# Patient Record
Sex: FEMALE | Race: WHITE
[De-identification: names, ages, dates, MRNs, and addresses within clinical notes are randomized per-mention and may not be internally consistent; named-entity substitution may affect disease eponyms.]

---

## 2017-01-07 ENCOUNTER — HOSPITAL ENCOUNTER (EMERGENCY)
Dept: HOSPITAL 17 - PHED | Age: 82
Discharge: HOME | End: 2017-01-07
Payer: MEDICARE

## 2017-01-07 VITALS
SYSTOLIC BLOOD PRESSURE: 157 MMHG | HEART RATE: 75 BPM | OXYGEN SATURATION: 97 % | DIASTOLIC BLOOD PRESSURE: 72 MMHG | RESPIRATION RATE: 15 BRPM | TEMPERATURE: 98.1 F

## 2017-01-07 VITALS — BODY MASS INDEX: 28.23 KG/M2 | HEIGHT: 64 IN | WEIGHT: 165.35 LBS

## 2017-01-07 VITALS — OXYGEN SATURATION: 97 %

## 2017-01-07 VITALS — SYSTOLIC BLOOD PRESSURE: 189 MMHG | DIASTOLIC BLOOD PRESSURE: 89 MMHG

## 2017-01-07 DIAGNOSIS — Z87.19: ICD-10-CM

## 2017-01-07 DIAGNOSIS — Z87.39: ICD-10-CM

## 2017-01-07 DIAGNOSIS — Z86.79: ICD-10-CM

## 2017-01-07 DIAGNOSIS — I10: ICD-10-CM

## 2017-01-07 DIAGNOSIS — R94.31: ICD-10-CM

## 2017-01-07 DIAGNOSIS — Z86.69: ICD-10-CM

## 2017-01-07 DIAGNOSIS — E78.00: ICD-10-CM

## 2017-01-07 DIAGNOSIS — R42: Primary | ICD-10-CM

## 2017-01-07 LAB
ALP SERPL-CCNC: 58 U/L (ref 45–117)
ALT SERPL-CCNC: 18 U/L (ref 10–53)
ANION GAP SERPL CALC-SCNC: 9 MEQ/L (ref 5–15)
APTT BLD: 31.3 SEC (ref 24.3–30.1)
AST SERPL-CCNC: 14 U/L (ref 15–37)
BASOPHILS # BLD AUTO: 0 TH/MM3 (ref 0–0.2)
BASOPHILS NFR BLD: 0.6 % (ref 0–2)
BILIRUB SERPL-MCNC: 0.4 MG/DL (ref 0.2–1)
BUN SERPL-MCNC: 18 MG/DL (ref 7–18)
CHLORIDE SERPL-SCNC: 101 MEQ/L (ref 98–107)
COLOR UR: YELLOW
COMMENT (UR): (no result)
CULTURE IF INDICATED: (no result)
EOSINOPHIL # BLD: 0.1 TH/MM3 (ref 0–0.4)
EOSINOPHIL NFR BLD: 1.6 % (ref 0–4)
ERYTHROCYTE [DISTWIDTH] IN BLOOD BY AUTOMATED COUNT: 13.7 % (ref 11.6–17.2)
GFR SERPLBLD BASED ON 1.73 SQ M-ARVRAT: 68 ML/MIN (ref 89–?)
GLUCOSE UR STRIP-MCNC: (no result) MG/DL
HCO3 BLD-SCNC: 29 MEQ/L (ref 21–32)
HCT VFR BLD CALC: 42 % (ref 35–46)
HEMO FLAGS: (no result)
HGB UR QL STRIP: (no result)
INR PPP: 1 RATIO
KETONES UR STRIP-MCNC: (no result) MG/DL
LEUKOCYTE ESTERASE UR QL STRIP: (no result) /HPF (ref 0–5)
LYMPHOCYTES # BLD AUTO: 1.5 TH/MM3 (ref 1–4.8)
LYMPHOCYTES NFR BLD AUTO: 19.5 % (ref 9–44)
MCH RBC QN AUTO: 28.5 PG (ref 27–34)
MCHC RBC AUTO-ENTMCNC: 33 % (ref 32–36)
MCV RBC AUTO: 86.5 FL (ref 80–100)
METHOD OF COLLECTION: (no result)
MONOCYTES NFR BLD: 6 % (ref 0–8)
NEUTROPHILS # BLD AUTO: 5.4 TH/MM3 (ref 1.8–7.7)
NEUTROPHILS NFR BLD AUTO: 72.3 % (ref 16–70)
NITRITE UR QL STRIP: (no result)
PLATELET # BLD: 278 TH/MM3 (ref 150–450)
POTASSIUM SERPL-SCNC: 3.8 MEQ/L (ref 3.5–5.1)
PROTHROMBIN TIME: 10.6 SEC (ref 9.8–11.6)
RBC # BLD AUTO: 4.85 MIL/MM3 (ref 4–5.3)
RBC #/AREA URNS HPF: (no result) /HPF (ref 0–3)
SODIUM SERPL-SCNC: 139 MEQ/L (ref 136–145)
SP GR UR STRIP: 1.02 (ref 1–1.03)
SQUAMOUS #/AREA URNS HPF: (no result) /HPF (ref 0–5)
WBC # BLD AUTO: 7.5 TH/MM3 (ref 4–11)

## 2017-01-07 PROCEDURE — 93005 ELECTROCARDIOGRAM TRACING: CPT

## 2017-01-07 PROCEDURE — 81001 URINALYSIS AUTO W/SCOPE: CPT

## 2017-01-07 PROCEDURE — 96361 HYDRATE IV INFUSION ADD-ON: CPT

## 2017-01-07 PROCEDURE — 85730 THROMBOPLASTIN TIME PARTIAL: CPT

## 2017-01-07 PROCEDURE — 85025 COMPLETE CBC W/AUTO DIFF WBC: CPT

## 2017-01-07 PROCEDURE — 84484 ASSAY OF TROPONIN QUANT: CPT

## 2017-01-07 PROCEDURE — 80053 COMPREHEN METABOLIC PANEL: CPT

## 2017-01-07 PROCEDURE — 70450 CT HEAD/BRAIN W/O DYE: CPT

## 2017-01-07 PROCEDURE — 96374 THER/PROPH/DIAG INJ IV PUSH: CPT

## 2017-01-07 PROCEDURE — 99284 EMERGENCY DEPT VISIT MOD MDM: CPT

## 2017-01-07 PROCEDURE — 85610 PROTHROMBIN TIME: CPT

## 2017-01-07 RX ADMIN — SODIUM CHLORIDE, PRESERVATIVE FREE PRN ML: 5 INJECTION INTRAVENOUS at 14:43

## 2017-01-07 RX ADMIN — SODIUM CHLORIDE, PRESERVATIVE FREE PRN ML: 5 INJECTION INTRAVENOUS at 13:03

## 2017-01-07 NOTE — RADHPO
EXAM DATE/TIME:  01/07/2017 13:09 

 

[facility] COMPARISON:     No previous studies available for comparison.

 

INDICATIONS :     Dizziness today.

                      

RADIATION DOSE:     65.90 CTDIvol (mGy) 

 

 

MEDICAL HISTORY :     Hypertension.  

SURGICAL HISTORY :      Tonsillectomy. 

ENCOUNTER:      Initial

ACUITY:      1 day

PAIN SCALE:      0/10

LOCATION:       Bilateral  head

 

TECHNIQUE:     Multiple contiguous axial images were obtained of the head.  Using automated exposure 
control and adjustment of the mA and/or kV according to patient size, radiation dose was kept as low 
as reasonably achievable to obtain optimal diagnostic quality images. 

 

FINDINGS:     

CEREBRUM:     The ventricles are normal for age.  No evidence of midline shift, mass lesion, hemorrha
ge or acute infarction.  No extra-axial fluid collections are seen.

POSTERIOR FOSSA:     The cerebellum and brainstem are intact.  The 4th ventricle is midline.  The cer
ebellopontine angle is unremarkable.

EXTRACRANIAL:     The visualized portion of the orbits is intact.

SKULL:     The calvaria is intact.  No evidence of skull fracture.

 

CONCLUSION:     Negative for acute process.  

 

Jon Perez MD FACR on January 07, 2017 at 13:25           

Board Certified Radiologist.

 This report was verified electronically.

## 2017-01-07 NOTE — EKG
Date Performed: 01/07/2017       Time Performed: 12:52:22

 

PTAGE:      85 years

 

EKG:      Sinus rhythm 

 

 Possible left anterior fascicular block QRS changes V3/V4 may be due to LVH but cannot rule out ante
rior infarct Abnormal ECG

 

PREVIOUS TRACING       : 04/14/2014 18.42 Since previous tracing, no significant change noted

 

DOCTOR:   Orion Falcon  Interpretating Date/Time  01/07/2017 15:13:11

## 2017-01-07 NOTE — PD
HPI


Chief Complaint:  Dizziness


Time Seen by Provider:  12:34


Travel History


International Travel<30 days:  No


Contact w/Intl Traveler<30days:  No


Traveled to known affect area:  No





History of Present Illness


HPI


Patient is an 85-year-old female who comes in complaining of dizziness.  She 

says she has been having a fullness in her right ear for the past few days, and 

has felt very dizzy today when she woke up.  She says the dizziness mostly 

comes on when she sits or stands up.  He feels better when she lays still.  She 

denies any falls or head trauma.  She denies any chest pain or shortness of 

breath.  She denies any fever or chills.





PFSH


Past Medical History


Arthritis:  Yes


Blood Disorders:  No


Heart Rhythm Problems:  No


Cancer:  No


Cardiovascular Problems:  Yes


High Cholesterol:  Yes


Chest Pain:  Yes


Congestive Heart Failure:  No


Diminished Hearing:  Yes (STS SHE WEARS BILAT HEARING)


Endocrine:  No


Gastrointestinal Disorders:  Yes (GI BLEED)


Genitourinary:  No


Hypertension:  Yes


Immune Disorder:  No


Implanted Vascular Access Dvce:  Yes


Musculoskeletal:  Yes


Neurologic:  Yes


Psychiatric:  No


Reproductive:  No


Respiratory:  No


Menopausal:  Yes





Past Surgical History


Abdominal Surgery:  Yes (APPENDECTOMY (CHILDHOOD))


AICD:  No


Appendectomy:  Yes (childhood)


Arteriovenous Shunt:  No


Cardiac Surgery:  Yes


Ear Surgery:  No


Endocrine Surgery:  No


Eye Surgery:  No


Genitourinary Surgery:  No


Gynecologic Surgery:  No


Insulin Pump:  No


Joint Replacement:  Yes (bilateral knee)


Oral Surgery:  Yes (T&A (CHILDHOOD))


Pacemaker:  No


Thoracic Surgery:  No


Tonsillectomy:  Yes


Other Surgery:  Yes (BREAST BX X 2)





Social History


Alcohol Use:  No


Tobacco Use:  No


Substance Use:  No





Allergies-Medications


(Allergen,Severity, Reaction):  


Coded Allergies:  


     Diclofenac (Verified  Allergy, Severe, SEVERE VOMITING, 1/7/17)


     Paxil (Verified  Allergy, Unknown, 1/7/17)


     Septra (Verified  Allergy, Unknown, 1/7/17)


     Zoloft (Verified  Allergy, Unknown, 1/7/17)


Uncoded Allergies:  


     PAIN MEDICATIONS (Allergy, Unknown, 1/7/17)


Reported Meds & Prescriptions





Reported Meds & Active Scripts


Active


Reported


[Acid Reflux]     


[Cholesterol Med]     


[B/P Med]     








Review of Systems


Except as stated in HPI:  all other systems reviewed are Neg


General / Constitutional:  No: Fever, Chills


Eyes:  No: Blurred Vision


HENT:  Positive: Vertigo,  No: Headaches


Cardiovascular:  No: Chest Pain or Discomfort


Respiratory:  No: Shortness of Breath


Gastrointestinal:  No: Nausea, Vomiting


Musculoskeletal:  No: Weakness, Edema


Skin:  No Change in Pigmentation


Neurologic:  Positive: Dizziness





Physical Exam


Narrative


GENERAL: Awake and alert in no acute distress.


SKIN: Warm and dry.


HEAD: Atraumatic. Normocephalic. 


EYES: Pupils equal and round. No scleral icterus.  Extraocular movements intact.


ENT: Mucous membranes pink and moist.  Tympanic membranes with no erythema 

bilaterally, light reflex present bilaterally.


NECK: Trachea midline. No JVD. 


CARDIOVASCULAR: Regular rate and rhythm.  No murmur appreciated.


RESPIRATORY: No accessory muscle use. Clear to auscultation. Breath sounds 

equal bilaterally. 


GASTROINTESTINAL: Abdomen soft, non-tender, nondistended. 


MUSCULOSKELETAL: No obvious deformities. No clubbing.  No cyanosis.  No edema. 


NEUROLOGICAL: Awake and alert. No obvious cranial nerve deficits.  Motor 

grossly within normal limits. Normal speech.  Normal cerebellar function 

testing.


PSYCHIATRIC: Appropriate mood and affect; insight and judgment normal.





Data


Data


Last Documented VS





Vital Signs








  Date Time  Temp Pulse Resp B/P Pulse Ox O2 Delivery O2 Flow Rate FiO2


 


1/7/17 12:50     97 Room Air  


 


1/7/17 12:31 98.1 75 15 157/72    








Orders





 Electrocardiogram (1/7/17 12:39)


Complete Blood Count With Diff (1/7/17 12:39)


Comprehensive Metabolic Panel (1/7/17 12:39)


Troponin I (1/7/17 12:39)


Act Partial Throm Time (Ptt) (1/7/17 12:39)


Prothrombin Time / Inr (Pt) (1/7/17 12:39)


Urinalysis - C+S If Indicated (1/7/17 12:39)


Ua Includes Microscopic (1/7/17 12:39)


Ct Brain W/O Iv Contrast(Rout) (1/7/17 12:39)


Ecg Monitoring (1/7/17 12:39)


Iv Access Insert/Monitor (1/7/17 12:39)


Oximetry (1/7/17 12:39)


Meclizine (Antivert) (1/7/17 12:45)


Ondansetron Inj (Zofran Inj) (1/7/17 12:45)


Sodium Chloride 0.9% Flush (Ns Flush) (1/7/17 12:45)


Sodium Chlorid 0.9% 500 Ml Inj (Ns 500 M (1/7/17 12:45)


Metoclopramide (Reglan) (1/7/17 14:00)





Labs





 Laboratory Tests








Test 1/7/17 1/7/17





 12:51 13:40


 


White Blood Count 7.5 TH/MM3 


 


Red Blood Count 4.85 MIL/MM3 


 


Hemoglobin 13.8 GM/DL 


 


Hematocrit 42.0 % 


 


Mean Corpuscular Volume 86.5 FL 


 


Mean Corpuscular Hemoglobin 28.5 PG 


 


Mean Corpuscular Hemoglobin 33.0 % 





Concent  


 


Red Cell Distribution Width 13.7 % 


 


Platelet Count 278 TH/MM3 


 


Mean Platelet Volume 7.0 FL 


 


Neutrophils (%) (Auto) 72.3 % 


 


Lymphocytes (%) (Auto) 19.5 % 


 


Monocytes (%) (Auto) 6.0 % 


 


Eosinophils (%) (Auto) 1.6 % 


 


Basophils (%) (Auto) 0.6 % 


 


Neutrophils # (Auto) 5.4 TH/MM3 


 


Lymphocytes # (Auto) 1.5 TH/MM3 


 


Monocytes # (Auto) 0.5 TH/MM3 


 


Eosinophils # (Auto) 0.1 TH/MM3 


 


Basophils # (Auto) 0.0 TH/MM3 


 


CBC Comment DIFF FINAL  


 


Differential Comment   


 


Prothrombin Time 10.6 SEC 


 


Prothromb Time International 1.0 RATIO 





Ratio  


 


Activated Partial 31.3 SEC 





Thromboplast Time  


 


Sodium Level 139 MEQ/L 


 


Potassium Level 3.8 MEQ/L 


 


Chloride Level 101 MEQ/L 


 


Carbon Dioxide Level 29.0 MEQ/L 


 


Anion Gap 9 MEQ/L 


 


Blood Urea Nitrogen 18 MG/DL 


 


Creatinine 0.80 MG/DL 


 


Estimat Glomerular Filtration 68 ML/MIN 





Rate  


 


Random Glucose 108 MG/DL 


 


Calcium Level 9.3 MG/DL 


 


Total Bilirubin 0.4 MG/DL 


 


Aspartate Amino Transf 14 U/L 





(AST/SGOT)  


 


Alanine Aminotransferase 18 U/L 





(ALT/SGPT)  


 


Alkaline Phosphatase 58 U/L 


 


Troponin I LESS THAN 0.02 





 NG/ML 


 


Total Protein 7.1 GM/DL 


 


Albumin 3.3 GM/DL 


 


Urine Collection Type  CLEAN CATCH 


 


Urine Color  YELLOW 


 


Urine Turbidity  CLEAR 


 


Urine pH  6.5 


 


Urine Specific Gravity  1.017 


 


Urine Protein  NEG mg/dL


 


Urine Glucose (UA)  NEG mg/dL


 


Urine Ketones  TRACE mg/dL


 


Urine Occult Blood  NEG 


 


Urine Nitrite  NEG 


 


Urine Bilirubin  NEG 


 


Urine Leukocyte Esterase  NEG 


 


Urine RBC  0-3 /hpf


 


Urine WBC  0-2 /hpf


 


Urine Squamous Epithelial  6-8 /hpf





Cells  


 


Microscopic Urinalysis Comment  CULT NOT





  INDICATED


 


Urine Collection Time  13:40 











[facility]


Medical Decision Making


Medical Screen Exam Complete:  Yes


Emergency Medical Condition:  Yes


Interpretation(s)


ECG shows normal sinus rhythm at 63, no ST elevation or depression.  Normal 

intervals.


Differential Diagnosis


ICH versus vertigo versus electrolyte imbalance versus infection versus 

arrhythmia


Narrative Course


Patient is an 85-year-old female comes in complaining of dizziness that is 

worse with sitting or standing up.  Exam shows no neurologic abnormalities.  IV 

established, patient connected to cardiac monitor.  ECG shows normal sinus 

rhythm, normal intervals.





Labs sent show no acute abnormalities.  CT of the head performed shows no acute 

abnormalities.  Patient given IV fluids, meclizine.  She reports some 

improvement of her symptoms, but still feels a little dizzy.





Given a dose of Reglan.  She reports resolution of her symptoms.





Patient will be discharged with prescription for meclizine.  Advised follow-up 

with her doctor.  Advised she may need to follow-up with ENT if her symptoms 

continue.  Advised to return to the ED as needed for any worsening symptoms.  

Patient is comfortable discharge at this time.





Diagnosis





 Primary Impression:  


 Vertigo


Patient Instructions:  Benign Paroxysmal Positional Vertigo (ED), General 

Instructions





***Additional Instructions:


Follow up with your doctor.  Take the Meclizine as needed for dizziness.  Be 

sure to drink plenty of fluids.  Return to the ED as needed for any worsening 

symptoms.


Scripts


Meclizine 25 Mg Tab25 Mg PO TID PRN (VERTIGO) 7 Days  Ref 0


   Prov:Vanessa Diaz MD         1/7/17


Disposition:  01 DISCHARGE HOME


Condition:  Stable








Vanessa Diaz MD Jan 7, 2017 14:27

## 2017-01-28 ENCOUNTER — HOSPITAL ENCOUNTER (EMERGENCY)
Dept: HOSPITAL 17 - PHED | Age: 82
Discharge: HOME | End: 2017-01-28
Payer: MEDICARE

## 2017-01-28 VITALS — WEIGHT: 165.35 LBS | BODY MASS INDEX: 28.23 KG/M2 | HEIGHT: 64 IN

## 2017-01-28 VITALS
OXYGEN SATURATION: 96 % | HEART RATE: 66 BPM | DIASTOLIC BLOOD PRESSURE: 70 MMHG | RESPIRATION RATE: 16 BRPM | SYSTOLIC BLOOD PRESSURE: 159 MMHG | TEMPERATURE: 95.2 F

## 2017-01-28 VITALS
RESPIRATION RATE: 18 BRPM | DIASTOLIC BLOOD PRESSURE: 61 MMHG | OXYGEN SATURATION: 94 % | SYSTOLIC BLOOD PRESSURE: 146 MMHG | HEART RATE: 56 BPM

## 2017-01-28 VITALS
DIASTOLIC BLOOD PRESSURE: 67 MMHG | SYSTOLIC BLOOD PRESSURE: 172 MMHG | RESPIRATION RATE: 18 BRPM | OXYGEN SATURATION: 96 % | HEART RATE: 58 BPM

## 2017-01-28 VITALS
HEART RATE: 65 BPM | SYSTOLIC BLOOD PRESSURE: 169 MMHG | DIASTOLIC BLOOD PRESSURE: 85 MMHG | OXYGEN SATURATION: 96 % | RESPIRATION RATE: 18 BRPM

## 2017-01-28 VITALS — DIASTOLIC BLOOD PRESSURE: 63 MMHG | RESPIRATION RATE: 18 BRPM | SYSTOLIC BLOOD PRESSURE: 156 MMHG

## 2017-01-28 VITALS — OXYGEN SATURATION: 96 %

## 2017-01-28 DIAGNOSIS — R94.31: ICD-10-CM

## 2017-01-28 DIAGNOSIS — R42: Primary | ICD-10-CM

## 2017-01-28 DIAGNOSIS — R53.1: ICD-10-CM

## 2017-01-28 DIAGNOSIS — E78.00: ICD-10-CM

## 2017-01-28 DIAGNOSIS — I10: ICD-10-CM

## 2017-01-28 LAB
ANION GAP SERPL CALC-SCNC: 7 MEQ/L (ref 5–15)
BASOPHILS # BLD AUTO: 0.3 TH/MM3 (ref 0–0.2)
BASOPHILS NFR BLD: 4.5 % (ref 0–2)
BUN SERPL-MCNC: 16 MG/DL (ref 7–18)
CHLORIDE SERPL-SCNC: 94 MEQ/L (ref 98–107)
CK SERPL-CCNC: 32 U/L (ref 26–192)
EOSINOPHIL # BLD: 0.3 TH/MM3 (ref 0–0.4)
EOSINOPHIL NFR BLD: 4 % (ref 0–4)
ERYTHROCYTE [DISTWIDTH] IN BLOOD BY AUTOMATED COUNT: 13.1 % (ref 11.6–17.2)
GFR SERPLBLD BASED ON 1.73 SQ M-ARVRAT: 60 ML/MIN (ref 89–?)
HCO3 BLD-SCNC: 32 MEQ/L (ref 21–32)
HCT VFR BLD CALC: 40.8 % (ref 35–46)
HEMO FLAGS: (no result)
LYMPHOCYTES # BLD AUTO: 1.5 TH/MM3 (ref 1–4.8)
LYMPHOCYTES NFR BLD AUTO: 21.2 % (ref 9–44)
MAGNESIUM SERPL-MCNC: 2.1 MG/DL (ref 1.5–2.5)
MCH RBC QN AUTO: 29 PG (ref 27–34)
MCHC RBC AUTO-ENTMCNC: 33.5 % (ref 32–36)
MCV RBC AUTO: 86.3 FL (ref 80–100)
MONOCYTES NFR BLD: 7.8 % (ref 0–8)
NEUTROPHILS # BLD AUTO: 4.6 TH/MM3 (ref 1.8–7.7)
NEUTROPHILS NFR BLD AUTO: 62.5 % (ref 16–70)
PLATELET # BLD: 261 TH/MM3 (ref 150–450)
POTASSIUM SERPL-SCNC: 2.9 MEQ/L (ref 3.5–5.1)
RBC # BLD AUTO: 4.73 MIL/MM3 (ref 4–5.3)
SODIUM SERPL-SCNC: 133 MEQ/L (ref 136–145)
WBC # BLD AUTO: 7.3 TH/MM3 (ref 4–11)

## 2017-01-28 PROCEDURE — 93005 ELECTROCARDIOGRAM TRACING: CPT

## 2017-01-28 PROCEDURE — 96361 HYDRATE IV INFUSION ADD-ON: CPT

## 2017-01-28 PROCEDURE — 84484 ASSAY OF TROPONIN QUANT: CPT

## 2017-01-28 PROCEDURE — A9579 GAD-BASE MR CONTRAST NOS,1ML: HCPCS

## 2017-01-28 PROCEDURE — 83735 ASSAY OF MAGNESIUM: CPT

## 2017-01-28 PROCEDURE — 85025 COMPLETE CBC W/AUTO DIFF WBC: CPT

## 2017-01-28 PROCEDURE — 99284 EMERGENCY DEPT VISIT MOD MDM: CPT

## 2017-01-28 PROCEDURE — 82550 ASSAY OF CK (CPK): CPT

## 2017-01-28 PROCEDURE — 96360 HYDRATION IV INFUSION INIT: CPT

## 2017-01-28 PROCEDURE — 70553 MRI BRAIN STEM W/O & W/DYE: CPT

## 2017-01-28 PROCEDURE — 80048 BASIC METABOLIC PNL TOTAL CA: CPT

## 2017-01-28 NOTE — RADHPO
EXAM DATE/TIME:  01/28/2017 10:51 

 

[facility] COMPARISON:     

CT BRAIN W/O CONTRAST, January 07, 2017, 13:09.

       

 

 

INDICATIONS :     

Dizziness.

                     

 

CONTRAST:     

15 cc Omniscan (gadodiamide) IV

                     

 

MEDICAL HISTORY :     

Hypercholesterolemia. Gastroesophageal reflux disease. Hypertension. 

 

SURGICAL HISTORY :     

Appendectomy. Tonsillectomy.   Bilateral knee replacements.

 

ENCOUNTER:     

Initial

 

ACUITY:     

1 day

 

PAIN SCORE:     

0/10

 

LOCATION:       

cranial 

 

TECHNIQUE:     

Multiplanar, multisequence MRI of the brain was performed both prior to and following the administrat
ion of paramagnetic contrast.

 

FINDINGS:     

 

CEREBRUM:     

The ventricles are normal for age. There is bilateral cortical atrophy. No evidence of midline shift,
 mass lesion, hemorrhage or acute infarction.  No extraaxial fluid collections are seen.  The pituita
ry gland and suprasellar cistern are normal in configuration.

 

WHITE MATTER:     

No significant signal abnormalities are seen in the white matter. A few high signal spots are seen in
 the white matter tracts bilaterally characteristic of ischemic edema patient. This correlates with p
atient's age.

 

POSTERIOR FOSSA:     

The cerebellum and brainstem are intact.  The 4th ventricle is midline. The cerebellopontine angle is
 unremarkable.  The cerebellar tonsils are normal in position.

 

DIFFUSION IMAGING:     

No focal areas of restricted diffusion are seen.  No evidence of acute infarction.

 

EXTRACRANIAL:     

The visualized portions of the orbits and paranasal sinuses are unremarkable.

 

POST-CONTRAST:     

No abnormal areas of parenchymal or dural enhancement.  No evidence of blood-brain barrier breakdown.


 

CONCLUSION:     

1. Bilateral cortical atrophy and chronic white matter changes characteristic for patient's age.

2. No acute intracranial pathology.

 

 

 

 Vern Perdue MD on January 28, 2017 at 11:24           

Board Certified Radiologist.

 This report was verified electronically.

## 2017-01-28 NOTE — PD
HPI


Chief Complaint:  Dizziness


Time Seen by Provider:  08:34


Travel History


International Travel<30 days:  No


Contact w/Intl Traveler<30days:  No


Traveled to known affect area:  No





History of Present Illness


HPI


The patient is a 85-year-old  female who presents emergency department 

for dizziness.  The patient states she had dizziness several weeks ago and was 

evaluated in the emergency department.  The patient was diagnosed with vertigo 

at that time and was prescribed meclizine.  The patient states her symptoms did 

improve, however, the meclizine constipated her and also made her significantly 

drowsy.  The patient called her primary physician's office, Dr. Maciel, who 

advised to take her medications.  However, the patient states she is unable to 

tolerate her medications, therefore, stopped taking her medications.  The 

patient states she awakened this morning to use the restroom, when she stood up 

she had vertigo.  Patient states everything was spinning, states she 

subsequently fell, however, did not strike her head or lose consciousness.  The 

patient currently complains of intermittent vertigo which is worse with 

positional movements and sitting upright, also states she has difficulty 

ambulating secondary to weakness and vertigo.  She denies any focal deficits of 

the upper or lower extremities.  The patient denies any history of CVA or TIA.  

She does note a history of hypertension and hyperlipidemia.  The patient's 

symptoms are moderate, worse with certain positional changes, and alleviated 

with meclizine, however, the patient was unable to tolerate the meclizine.





PFSH


Past Medical History


Arthritis:  Yes


Blood Disorders:  No


Heart Rhythm Problems:  No


Cancer:  No


Cardiovascular Problems:  Yes


High Cholesterol:  Yes


Chest Pain:  Yes


Congestive Heart Failure:  No


Diminished Hearing:  Yes (STS SHE WEARS BILAT HEARING)


Endocrine:  No


Gastrointestinal Disorders:  Yes (GI BLEED)


Genitourinary:  No


Hypertension:  Yes


Immune Disorder:  No


Implanted Vascular Access Dvce:  Yes


Musculoskeletal:  Yes


Neurologic:  Yes


Psychiatric:  No


Reproductive:  No


Respiratory:  No


Menopausal:  Yes





Past Surgical History


Abdominal Surgery:  Yes (APPENDECTOMY (CHILDHOOD))


AICD:  No


Appendectomy:  Yes (childhood)


Arteriovenous Shunt:  No


Cardiac Surgery:  Yes


Ear Surgery:  No


Endocrine Surgery:  No


Eye Surgery:  No


Genitourinary Surgery:  No


Gynecologic Surgery:  No


Insulin Pump:  No


Joint Replacement:  Yes (bilateral knee)


Oral Surgery:  Yes (T&A (CHILDHOOD))


Pacemaker:  No


Thoracic Surgery:  No


Tonsillectomy:  Yes


Other Surgery:  Yes (BREAST BX X 2)





Social History


Alcohol Use:  No


Tobacco Use:  No


Substance Use:  No





Allergies-Medications


(Allergen,Severity, Reaction):  


Coded Allergies:  


     Diclofenac (Verified  Allergy, Severe, SEVERE VOMITING, 1/28/17)


     Paxil (Verified  Allergy, Unknown, 1/28/17)


     Septra (Verified  Allergy, Unknown, 1/28/17)


     Zoloft (Verified  Allergy, Unknown, 1/28/17)


Uncoded Allergies:  


     PAIN MEDICATIONS (Allergy, Unknown, 1/7/17)


Reported Meds & Prescriptions





Reported Meds & Active Scripts


Active


Reported


[Cholesterol Med]     


[B/P Med]     








Review of Systems


Except as stated in HPI:  all other systems reviewed are Neg


Eyes:  No: Diploplia, Blurred Vision


HENT:  Positive: Headaches, Vertigo,  No: Lightheadedness


Cardiovascular:  No: Chest Pain or Discomfort


Respiratory:  No: Shortness of Breath


Gastrointestinal:  No: Nausea, Vomiting


Neurologic:  Positive: Dizziness, Ataxia (difficulty walking secondary to 

vertigo), Headache,  No: Change in Mentation, Slurred Speech, Paresthesia, 

Sensory Disturbance





Physical Exam


Narrative


GENERAL: Awake, alert, 85-year-old female who appears her stated age and is in 

no acute respiratory distress.


SKIN: Warm and dry.


HEAD: Atraumatic. Normocephalic. 


EYES: Pupils equal and round.  3 mm bilateral and reactive.  EOMs are intact.  

No obvious nystagmus.


ENT: No nasal bleeding or discharge.  Mucous membranes pink and moist.


NECK: Trachea midline. No JVD. 


CARDIOVASCULAR: Regular rate and rhythm.  No murmur appreciated.


RESPIRATORY: No accessory muscle use. Clear to auscultation. Breath sounds 

equal bilaterally. 


GASTROINTESTINAL: Abdomen soft, non-tender, nondistended.  No rebound 

tenderness.


MUSCULOSKELETAL: No obvious deformities. No clubbing.  No cyanosis.  No edema. 


NEUROLOGICAL: Awake and alert. No obvious cranial nerve deficits.  Motor 

grossly within normal limits. Normal speech.  Nonfocal.  No drift of the upper 

or lower extremities.  Heel-to-shin is normal.  Finger to nose is normal.  

Patient was stood up and ambulated, has slight shuffling gait but no obvious 

ataxia.


PSYCHIATRIC: Appropriate mood and affect; insight and judgment normal.





Data


Data


Last Documented VS





Vital Signs








  Date Time  Temp Pulse Resp B/P Pulse Ox O2 Delivery O2 Flow Rate FiO2


 


1/28/17 11:17  65 18 169/85 96 Room Air  


 


1/28/17 08:23 95.2       








Orders





 Electrocardiogram (1/28/17 08:34)


Basic Metabolic Panel (Bmp) (1/28/17 08:34)


Complete Blood Count With Diff (1/28/17 08:34)


Magnesium (Mg) (1/28/17 08:34)


Ckmb (Isoenzyme) Profile (1/28/17 08:34)


Troponin I (1/28/17 08:34)


Ecg Monitoring (1/28/17 08:34)


Iv Access Insert/Monitor (1/28/17 08:34)


Oximetry (1/28/17 08:34)


Meclizine (Antivert) (1/28/17 08:45)


Sodium Chloride 0.9% Flush (Ns Flush) (1/28/17 08:45)


Sodium Chlor 0.9% 1000 Ml Inj (Ns 1000 M (1/28/17 08:34)


Orthostatic Vital Signs (1/28/17 08:34)


Mri Brain W&W/O Contrast (1/28/17 )


Potassium Chloride (Kcl) (1/28/17 09:15)





Labs





 Laboratory Tests








Test 1/28/17





 08:45


 


White Blood Count 7.3 TH/MM3


 


Red Blood Count 4.73 MIL/MM3


 


Hemoglobin 13.7 GM/DL


 


Hematocrit 40.8 %


 


Mean Corpuscular Volume 86.3 FL


 


Mean Corpuscular Hemoglobin 29.0 PG


 


Mean Corpuscular Hemoglobin 33.5 %





Concent 


 


Red Cell Distribution Width 13.1 %


 


Platelet Count 261 TH/MM3


 


Mean Platelet Volume 7.4 FL


 


Neutrophils (%) (Auto) 62.5 %


 


Lymphocytes (%) (Auto) 21.2 %


 


Monocytes (%) (Auto) 7.8 %


 


Eosinophils (%) (Auto) 4.0 %


 


Basophils (%) (Auto) 4.5 %


 


Neutrophils # (Auto) 4.6 TH/MM3


 


Lymphocytes # (Auto) 1.5 TH/MM3


 


Monocytes # (Auto) 0.6 TH/MM3


 


Eosinophils # (Auto) 0.3 TH/MM3


 


Basophils # (Auto) 0.3 TH/MM3


 


CBC Comment DIFF FINAL 


 


Differential Comment  


 


Sodium Level 133 MEQ/L


 


Potassium Level 2.9 MEQ/L


 


Chloride Level 94 MEQ/L


 


Carbon Dioxide Level 32.0 MEQ/L


 


Anion Gap 7 MEQ/L


 


Blood Urea Nitrogen 16 MG/DL


 


Creatinine 0.89 MG/DL


 


Estimat Glomerular Filtration 60 ML/MIN





Rate 


 


Random Glucose 99 MG/DL


 


Calcium Level 8.8 MG/DL


 


Magnesium Level 2.1 MG/DL


 


Total Creatine Kinase 32 U/L


 


Troponin I LESS THAN 0.02





 NG/ML











MDM


Medical Decision Making


Medical Screen Exam Complete:  Yes


Emergency Medical Condition:  Yes


Medical Record Reviewed:  Yes


Interpretation(s)


EKG reveals sinus bradycardia with a heart rate of 59.  Nonspecific ST changes.








 Laboratory Tests








Test 1/28/17





 08:45


 


White Blood Count 7.3 TH/MM3


 


Red Blood Count 4.73 MIL/MM3


 


Hemoglobin 13.7 GM/DL


 


Hematocrit 40.8 %


 


Mean Corpuscular Volume 86.3 FL


 


Mean Corpuscular Hemoglobin 29.0 PG


 


Mean Corpuscular Hemoglobin 33.5 %





Concent 


 


Red Cell Distribution Width 13.1 %


 


Platelet Count 261 TH/MM3


 


Mean Platelet Volume 7.4 FL


 


Neutrophils (%) (Auto) 62.5 %


 


Lymphocytes (%) (Auto) 21.2 %


 


Monocytes (%) (Auto) 7.8 %


 


Eosinophils (%) (Auto) 4.0 %


 


Basophils (%) (Auto) 4.5 %


 


Neutrophils # (Auto) 4.6 TH/MM3


 


Lymphocytes # (Auto) 1.5 TH/MM3


 


Monocytes # (Auto) 0.6 TH/MM3


 


Eosinophils # (Auto) 0.3 TH/MM3


 


Basophils # (Auto) 0.3 TH/MM3


 


CBC Comment DIFF FINAL 


 


Differential Comment  


 


Sodium Level 133 MEQ/L


 


Potassium Level 2.9 MEQ/L


 


Chloride Level 94 MEQ/L


 


Carbon Dioxide Level 32.0 MEQ/L


 


Anion Gap 7 MEQ/L


 


Blood Urea Nitrogen 16 MG/DL


 


Creatinine 0.89 MG/DL


 


Estimat Glomerular Filtration 60 ML/MIN





Rate 


 


Random Glucose 99 MG/DL


 


Calcium Level 8.8 MG/DL


 


Magnesium Level 2.1 MG/DL


 


Total Creatine Kinase 32 U/L


 


Troponin I LESS THAN 0.02





 NG/ML





MRI reveals bilateral cortical atrophy and chronic white matter changes 

characteristic for patient's age.  No acute intracranial pathology.


Differential Diagnosis


Differential diagnosis includes benign positional vertigo, labyrinthitis, Mni

re's disease, serous otitis, intracranial hemorrhage, cerebellar infarct, 

hyponatremia, arrhythmia.


Narrative Course


IV was established, labs are drawn and sent, and the patient was placed on 

cardiac telemetry monitoring and continuous pulse oximetry monitoring.  Patient 

was administered meclizine and IV fluids.  Orthostatic vital signs were 

obtained.  I reviewed the EMR from the patient's previous visit for dizziness, 

her CT of the brain was negative for acute findings and sodium/troponin were 

normal.  Therefore, MRI was ordered to rule out cerebellar infarct.  Laboratory 

evaluation is unremarkable.  MRI is negative for cerebellar infarct.  The 

patient's symptoms had resolved, per to be intermittent.  Patient is advised to 

take the meclizine as needed and a follow-up with ENT.  She will be discharged 

home with family.  Return if symptoms worsen or progress.





Diagnosis





 Primary Impression:  


 Vertigo


Patient Instructions:  General Instructions





***Additional Instructions:


Follow-up with ENT.  Please provide a patient a copy of her labs and MRI 

results at discharge.  Return if symptoms worsen or progress.


***Med/Other Pt SpecificInfo:  Prescription(s) given


Scripts


Meclizine 25 Mg Tab25 Mg PO TID PRN (VERTIGO) #20 TAB  Ref 0


   Prov:Pankaj Green MD         1/28/17


Disposition:  01 DISCHARGE HOME


Condition:  Stable








Pankaj Green MD Jan 28, 2017 08:40

## 2017-01-28 NOTE — EKG
Date Performed: 01/28/2017       Time Performed: 08:38:34

 

PTAGE:      85 years

 

EKG:      Sinus bradycardia Left axis deviation Lateral ST-T changes may be due to hypertrophy and/or
 ischemia Compared to prior tracing no significant change Abnormal ECG

 

PREVIOUS TRACING       : 01/07/2017 12.52

 

DOCTOR:   Alexander Prabhakar  Interpretating Date/Time  01/28/2017 12:59:22

## 2018-02-02 ENCOUNTER — HOSPITAL ENCOUNTER (INPATIENT)
Dept: HOSPITAL 17 - PHED | Age: 83
LOS: 3 days | Discharge: NURSING, REHAB OR LONG-TERM CARE | DRG: 565 | End: 2018-02-05
Attending: HOSPITALIST | Admitting: HOSPITALIST
Payer: MEDICARE

## 2018-02-02 VITALS
HEART RATE: 69 BPM | RESPIRATION RATE: 18 BRPM | DIASTOLIC BLOOD PRESSURE: 74 MMHG | OXYGEN SATURATION: 94 % | SYSTOLIC BLOOD PRESSURE: 205 MMHG

## 2018-02-02 VITALS
DIASTOLIC BLOOD PRESSURE: 77 MMHG | OXYGEN SATURATION: 97 % | RESPIRATION RATE: 18 BRPM | HEART RATE: 89 BPM | SYSTOLIC BLOOD PRESSURE: 202 MMHG

## 2018-02-02 VITALS — HEIGHT: 65 IN | WEIGHT: 169.76 LBS | BODY MASS INDEX: 28.28 KG/M2

## 2018-02-02 VITALS
SYSTOLIC BLOOD PRESSURE: 189 MMHG | RESPIRATION RATE: 16 BRPM | HEART RATE: 72 BPM | OXYGEN SATURATION: 95 % | TEMPERATURE: 98.3 F | DIASTOLIC BLOOD PRESSURE: 88 MMHG

## 2018-02-02 VITALS — SYSTOLIC BLOOD PRESSURE: 198 MMHG | DIASTOLIC BLOOD PRESSURE: 78 MMHG

## 2018-02-02 DIAGNOSIS — I10: ICD-10-CM

## 2018-02-02 DIAGNOSIS — M25.00: ICD-10-CM

## 2018-02-02 DIAGNOSIS — M19.90: ICD-10-CM

## 2018-02-02 DIAGNOSIS — Z85.820: ICD-10-CM

## 2018-02-02 DIAGNOSIS — H91.90: ICD-10-CM

## 2018-02-02 DIAGNOSIS — M25.462: Primary | ICD-10-CM

## 2018-02-02 DIAGNOSIS — R26.2: ICD-10-CM

## 2018-02-02 DIAGNOSIS — Z96.653: ICD-10-CM

## 2018-02-02 DIAGNOSIS — E78.5: ICD-10-CM

## 2018-02-02 LAB
ALBUMIN SERPL-MCNC: 3.6 GM/DL (ref 3.4–5)
ALP SERPL-CCNC: 73 U/L (ref 45–117)
ALT SERPL-CCNC: 12 U/L (ref 10–53)
AST SERPL-CCNC: 15 U/L (ref 15–37)
BASOPHILS # BLD AUTO: 0.2 TH/MM3 (ref 0–0.2)
BASOPHILS NFR BLD: 1.4 % (ref 0–2)
BILIRUB SERPL-MCNC: 0.6 MG/DL (ref 0.2–1)
BUN SERPL-MCNC: 16 MG/DL (ref 7–18)
CALCIUM SERPL-MCNC: 9.4 MG/DL (ref 8.5–10.1)
CHLORIDE SERPL-SCNC: 98 MEQ/L (ref 98–107)
CREAT SERPL-MCNC: 0.85 MG/DL (ref 0.5–1)
EOSINOPHIL # BLD: 0 TH/MM3 (ref 0–0.4)
EOSINOPHIL NFR BLD: 0.3 % (ref 0–4)
ERYTHROCYTE [DISTWIDTH] IN BLOOD BY AUTOMATED COUNT: 14.2 % (ref 11.6–17.2)
GFR SERPLBLD BASED ON 1.73 SQ M-ARVRAT: 63 ML/MIN (ref 89–?)
GLUCOSE SERPL-MCNC: 128 MG/DL (ref 74–106)
HCO3 BLD-SCNC: 24.3 MEQ/L (ref 21–32)
HCT VFR BLD CALC: 40.3 % (ref 35–46)
HGB BLD-MCNC: 13.4 GM/DL (ref 11.6–15.3)
LYMPHOCYTES # BLD AUTO: 3.2 TH/MM3 (ref 1–4.8)
LYMPHOCYTES NFR BLD AUTO: 19.8 % (ref 9–44)
MCH RBC QN AUTO: 28.4 PG (ref 27–34)
MCHC RBC AUTO-ENTMCNC: 33.3 % (ref 32–36)
MCV RBC AUTO: 85.4 FL (ref 80–100)
MONOCYTE #: 1 TH/MM3 (ref 0–0.9)
MONOCYTES NFR BLD: 6.2 % (ref 0–8)
NEUTROPHILS # BLD AUTO: 11.6 TH/MM3 (ref 1.8–7.7)
NEUTROPHILS NFR BLD AUTO: 72.3 % (ref 16–70)
PLATELET # BLD: 319 TH/MM3 (ref 150–450)
PMV BLD AUTO: 7 FL (ref 7–11)
PROT SERPL-MCNC: 7.6 GM/DL (ref 6.4–8.2)
RBC # BLD AUTO: 4.73 MIL/MM3 (ref 4–5.3)
SODIUM SERPL-SCNC: 132 MEQ/L (ref 136–145)
WBC # BLD AUTO: 16 TH/MM3 (ref 4–11)

## 2018-02-02 PROCEDURE — 85025 COMPLETE CBC W/AUTO DIFF WBC: CPT

## 2018-02-02 PROCEDURE — 89051 BODY FLUID CELL COUNT: CPT

## 2018-02-02 PROCEDURE — 87205 SMEAR GRAM STAIN: CPT

## 2018-02-02 PROCEDURE — 73564 X-RAY EXAM KNEE 4 OR MORE: CPT

## 2018-02-02 PROCEDURE — 87070 CULTURE OTHR SPECIMN AEROBIC: CPT

## 2018-02-02 PROCEDURE — 20610 DRAIN/INJ JOINT/BURSA W/O US: CPT

## 2018-02-02 PROCEDURE — 93971 EXTREMITY STUDY: CPT

## 2018-02-02 PROCEDURE — 96372 THER/PROPH/DIAG INJ SC/IM: CPT

## 2018-02-02 PROCEDURE — 84157 ASSAY OF PROTEIN OTHER: CPT

## 2018-02-02 PROCEDURE — 82945 GLUCOSE OTHER FLUID: CPT

## 2018-02-02 PROCEDURE — 89060 EXAM SYNOVIAL FLUID CRYSTALS: CPT

## 2018-02-02 PROCEDURE — 96376 TX/PRO/DX INJ SAME DRUG ADON: CPT

## 2018-02-02 PROCEDURE — 96365 THER/PROPH/DIAG IV INF INIT: CPT

## 2018-02-02 PROCEDURE — 96375 TX/PRO/DX INJ NEW DRUG ADDON: CPT

## 2018-02-02 PROCEDURE — 80048 BASIC METABOLIC PNL TOTAL CA: CPT

## 2018-02-02 PROCEDURE — 85652 RBC SED RATE AUTOMATED: CPT

## 2018-02-02 PROCEDURE — 77002 NEEDLE LOCALIZATION BY XRAY: CPT

## 2018-02-02 PROCEDURE — G0378 HOSPITAL OBSERVATION PER HR: HCPCS

## 2018-02-02 PROCEDURE — 80053 COMPREHEN METABOLIC PANEL: CPT

## 2018-02-02 NOTE — RADRPT
EXAM DATE/TIME:  02/02/2018 21:17 

 

[facility] COMPARISON:     

No previous studies available for comparison.

        

 

 

INDICATIONS :                

Left leg pain. 

            

 

MEDICAL HISTORY :     

Hypertension.  Hypercholesterolemia.    Hearing aids. Glasses. Chest pain. GI bleed. Arthritis. 

 

SURGICAL HISTORY :     

Tonsillectomy. Appendectomy.   Adenoidectomy. Bilateral knee surgery. Breast biopsy. 

 

ENCOUNTER:     

Subsequent

 

ACUITY:     

1 day

 

PAIN SCORE:      

7/10

 

LOCATION:      

Left  leg. 

            

                      

 

TECHNIQUE:     

Venous ultrasound of the leg was performed from the inguinal ligament to the proximal calf.  Real-milena
e, color Doppler and spectral tracing, compression and augmentation techniques were used.  

 

FINDINGS:     

There is normal compressibility of the deep venous system from the inguinal region to the proximal ca
lf.  No echogenic clot is seen in the lumen of the common femoral, femoral, popliteal, and posterior 
tibial veins.  There is a normal response of the venous system to proximal and distal augmentation an
d respiration.  Popliteal cyst is noted measuring 3.9 x 2.5 x 2.1 cm.

 

CONCLUSION:     No evidence of deep venous thrombosis. Popliteal cyst on the left measuring 3.9 x 2.5
 x 2.1 cm. 

 

 

 Santy Montoya MD on February 02, 2018 at 21:39           

Board Certified Radiologist.

 This report was verified electronically.

## 2018-02-02 NOTE — RADRPT
EXAM DATE/TIME:  02/02/2018 20:34 

 

[facility] COMPARISON:     

No previous studies available for comparison.

 

                     

INDICATIONS :     

Left popliteal knee pain. No known injury. 

                     

 

MEDICAL HISTORY :     

Arthritis.       Hypercholesterolemia. Gastroesophageal reflux disease. Hypertension.   

 

SURGICAL HISTORY :     

Total knee replacement, left. Total knee replacement, right.  Appendectomy. Tonsillectomy

 

ENCOUNTER:     

Initial                                        

 

ACUITY:     

1 week      

 

PAIN SCORE:     

9/10

 

LOCATION:     

Left  knee

 

FINDINGS:     

A left knee replacement is noted. There is a large suprapatellar knee joint effusion. No definite acu
te fracture is noted.

 

CONCLUSION:     

Large suprapatellar knee joint effusion. 

 

 

 Santy Montoya MD on February 02, 2018 at 21:45           

Board Certified Radiologist.

 This report was verified electronically.

## 2018-02-02 NOTE — PD
HPI


Chief Complaint:  Pain: Acute or Chronic


Time Seen by Provider:  20:02


Travel History


International Travel<30 days:  No


Contact w/Intl Traveler<30days:  No


Traveled to known affect area:  No





History of Present Illness


HPI


86-year-old female that presents to the ED for evaluation of left knee pain.  

Per patient she had no injury.  Per patient today she had severe pain on her 

left knee.  She has a history of bilateral knee replacements.  She follows with 

Dr. Prabhakar for this.  She states that she's had since of this in the past and 

was given steroids with some improvement but she continues to have symptoms 

like this.  They come and go.  No history of gout.  No injury that she can 

think of.  No fall.  Per patient she does use a walker at home but is very 

difficult for her to ambulate because of the pain.  Per patient the pain is 7 

out of 10.  Skin here by ambulance for evaluation of this.  No numbness, tilling

, weakness.  No blood thinner use.  No other medical issues reported at this 

time.





PFSH


Past Medical History


Hx Anticoagulant Therapy:  No


Arthritis:  Yes


Blood Disorders:  No


Heart Rhythm Problems:  No


Cancer:  No


Cardiovascular Problems:  Yes (HTN, CHOL)


High Cholesterol:  Yes


Chest Pain:  Yes


Congestive Heart Failure:  No


Diabetes:  No


Diminished Hearing:  Yes (STS SHE WEARS BILAT HEARING)


Endocrine:  No


Gastrointestinal Disorders:  Yes (GI BLEED)


Genitourinary:  No


Hypertension:  Yes


Immune Disorder:  No


Implanted Vascular Access Dvce:  Yes


Musculoskeletal:  Yes


Neurologic:  Yes


Psychiatric:  No


Reproductive:  No


Respiratory:  No


Tetanus Vaccination:  > 5 Years


Influenza Vaccination:  No


Pregnant?:  Not Pregnant


Menopausal:  Yes





Past Surgical History


Abdominal Surgery:  Yes (APPENDECTOMY (CHILDHOOD))


AICD:  No


Appendectomy:  Yes (childhood)


Arteriovenous Shunt:  No


Cardiac Surgery:  Yes


Ear Surgery:  No


Endocrine Surgery:  No


Eye Surgery:  No


Genitourinary Surgery:  No


Gynecologic Surgery:  No


Insulin Pump:  No


Joint Replacement:  Yes (bilateral knee)


Oral Surgery:  Yes (T&A (CHILDHOOD))


Pacemaker:  No


Thoracic Surgery:  No


Tonsillectomy:  Yes


Other Surgery:  Yes (BREAST BX X 2)





Social History


Alcohol Use:  Yes (Occ.)


Tobacco Use:  No


Substance Use:  No





Allergies-Medications


(Allergen,Severity, Reaction):  


Coded Allergies:  


     diclofenac (Unverified  Allergy, Severe, SEVERE VOMITING, 2/2/18)


     paroxetine (Unverified  Allergy, Unknown, 2/2/18)


     sertraline (Unverified  Allergy, Unknown, 2/2/18)


     sulfamethoxazole (Unverified  Allergy, Unknown, 2/2/18)


     trimethoprim (Unverified  Allergy, Unknown, 2/2/18)


Uncoded Allergies:  


     PAIN MEDICATIONS (Allergy, Unknown, 1/7/17)


Reported Meds & Prescriptions





Reported Meds & Active Scripts


Active


Reported


Losartan (Losartan Potassium) 25 Mg Tab 25 Mg PO DAILY


Citalopram (Citalopram Hydrobromide) 10 Mg Tab 10 Mg PO DAILY


Ocuvite (Multiple Vitamins W/ Minerals) 1 Tab 1 Tab PO DAILY


Calcium Magnesium Caplet (Calcium Carb,Gluc/Mag Ox,Gluc) 500 Mg Calcium-250 Mg 

Tablet 1 Tab PO DAILY


Omeprazole 20 Mg Tab 20 Mg PO DAILY


Lovastatin 40 Mg Tab 40 Mg PO DAILY


Potassium Chloride ER (Potassium Chloride) 10 Meq Cap 10 Meq PO EVERY OTHER DAY


Furosemide 20 Mg Tab 20 Mg PO EVERY OTHER DAY








Review of Systems


Except as stated in HPI:  all other systems reviewed are Neg





Physical Exam


Narrative


GENERAL: 


SKIN: Warm and dry.


HEAD: Atraumatic. Normocephalic. 


EYES: Pupils equal and round. No scleral icterus. No injection or drainage. 


ENT: No nasal bleeding or discharge.  Mucous membranes pink and moist.


NECK: Trachea midline. No JVD. 


CARDIOVASCULAR: Regular rate and rhythm.  


RESPIRATORY: No accessory muscle use. Clear to auscultation. Breath sounds 

equal bilaterally. 


GASTROINTESTINAL: Abdomen soft, non-tender, nondistended. Hepatic and splenic 

margins not palpable. 


MUSCULOSKELETAL: Extremities without clubbing, cyanosis, or edema. No obvious 

deformities.  Full range of motion of the upper extremities.  Full range of 

motion of the right lower extremity.  Very painful for the patient to flex the 

left knee.  Varus and valgus does appear to be negative.  No sign of bruising 

or swelling but patient does have surgical scars to both knees bilaterally.  

Well-healed.  2+ pulses bilaterally.  Sensation intact bilaterally.


NEUROLOGICAL: Awake and alert. No obvious cranial nerve deficits.  Motor 

grossly within normal limits. Five out of 5 muscle strength in the arms and 

legs.  Normal speech.


PSYCHIATRIC: Appropriate mood and affect; insight and judgment normal.





Data


Data


Last Documented VS





Vital Signs








  Date Time  Temp Pulse Resp B/P (MAP) Pulse Ox O2 Delivery O2 Flow Rate FiO2


 


2/2/18 18:38 98.3 72 16 189/88 (121) 95   








Orders





 Orders


Knee, Complete (4vws) (2/2/18 20:04)


Ice/Cold Pack (2/2/18 20:04)


Us Leg Venous Doppler (2/2/18 20:04)


Acetaminophen (Tylenol) (2/2/18 20:15)








MDM


Medical Decision Making


Medical Screen Exam Complete:  Yes


Emergency Medical Condition:  Yes


Medical Record Reviewed:  Yes


Differential Diagnosis


Fracture versus sprain versus strain versus DVT


Narrative Course


86-year-old female that presents to the ED for evaluation of left knee injury.  

Patient was properly examined and was found to have signs and symptoms 

concerning for bony injury versus DVT.  X-ray and ultrasound was ordered.  Case 

will be signed out to my attending pending disposition and plan.











Spike Islas Feb 2, 2018 20:47

## 2018-02-03 VITALS
OXYGEN SATURATION: 96 % | RESPIRATION RATE: 18 BRPM | HEART RATE: 73 BPM | DIASTOLIC BLOOD PRESSURE: 66 MMHG | SYSTOLIC BLOOD PRESSURE: 155 MMHG

## 2018-02-03 VITALS
DIASTOLIC BLOOD PRESSURE: 77 MMHG | RESPIRATION RATE: 18 BRPM | OXYGEN SATURATION: 99 % | SYSTOLIC BLOOD PRESSURE: 141 MMHG | TEMPERATURE: 98.2 F | HEART RATE: 74 BPM

## 2018-02-03 VITALS
OXYGEN SATURATION: 93 % | SYSTOLIC BLOOD PRESSURE: 132 MMHG | TEMPERATURE: 96.2 F | DIASTOLIC BLOOD PRESSURE: 59 MMHG | RESPIRATION RATE: 18 BRPM | HEART RATE: 79 BPM

## 2018-02-03 VITALS
OXYGEN SATURATION: 96 % | SYSTOLIC BLOOD PRESSURE: 136 MMHG | RESPIRATION RATE: 18 BRPM | HEART RATE: 72 BPM | DIASTOLIC BLOOD PRESSURE: 57 MMHG

## 2018-02-03 VITALS
OXYGEN SATURATION: 95 % | DIASTOLIC BLOOD PRESSURE: 71 MMHG | RESPIRATION RATE: 18 BRPM | SYSTOLIC BLOOD PRESSURE: 151 MMHG | HEART RATE: 74 BPM

## 2018-02-03 VITALS
SYSTOLIC BLOOD PRESSURE: 177 MMHG | OXYGEN SATURATION: 94 % | RESPIRATION RATE: 18 BRPM | DIASTOLIC BLOOD PRESSURE: 75 MMHG | HEART RATE: 69 BPM

## 2018-02-03 VITALS
SYSTOLIC BLOOD PRESSURE: 180 MMHG | HEART RATE: 78 BPM | RESPIRATION RATE: 18 BRPM | OXYGEN SATURATION: 95 % | DIASTOLIC BLOOD PRESSURE: 86 MMHG

## 2018-02-03 VITALS
RESPIRATION RATE: 17 BRPM | OXYGEN SATURATION: 98 % | HEART RATE: 87 BPM | SYSTOLIC BLOOD PRESSURE: 191 MMHG | TEMPERATURE: 96.4 F | DIASTOLIC BLOOD PRESSURE: 81 MMHG

## 2018-02-03 VITALS — SYSTOLIC BLOOD PRESSURE: 161 MMHG | DIASTOLIC BLOOD PRESSURE: 94 MMHG

## 2018-02-03 VITALS — DIASTOLIC BLOOD PRESSURE: 86 MMHG | SYSTOLIC BLOOD PRESSURE: 175 MMHG

## 2018-02-03 VITALS
OXYGEN SATURATION: 96 % | HEART RATE: 77 BPM | RESPIRATION RATE: 18 BRPM | DIASTOLIC BLOOD PRESSURE: 103 MMHG | SYSTOLIC BLOOD PRESSURE: 181 MMHG

## 2018-02-03 LAB
BASOPHILS # BLD AUTO: 0.1 TH/MM3 (ref 0–0.2)
BASOPHILS NFR BLD: 1.5 % (ref 0–2)
BUN SERPL-MCNC: 14 MG/DL (ref 7–18)
CALCIUM SERPL-MCNC: 8.8 MG/DL (ref 8.5–10.1)
CHLORIDE SERPL-SCNC: 97 MEQ/L (ref 98–107)
CREAT SERPL-MCNC: 0.77 MG/DL (ref 0.5–1)
EOSINOPHIL # BLD: 0 TH/MM3 (ref 0–0.4)
EOSINOPHIL NFR BLD: 0.1 % (ref 0–4)
ERYTHROCYTE [DISTWIDTH] IN BLOOD BY AUTOMATED COUNT: 14.5 % (ref 11.6–17.2)
GFR SERPLBLD BASED ON 1.73 SQ M-ARVRAT: 71 ML/MIN (ref 89–?)
GLUCOSE SERPL-MCNC: 150 MG/DL (ref 74–106)
HCO3 BLD-SCNC: 28.5 MEQ/L (ref 21–32)
HCT VFR BLD CALC: 37.3 % (ref 35–46)
HGB BLD-MCNC: 12.5 GM/DL (ref 11.6–15.3)
LYMPHOCYTES # BLD AUTO: 1 TH/MM3 (ref 1–4.8)
LYMPHOCYTES NFR BLD AUTO: 10 % (ref 9–44)
MCH RBC QN AUTO: 28.6 PG (ref 27–34)
MCHC RBC AUTO-ENTMCNC: 33.6 % (ref 32–36)
MCV RBC AUTO: 85 FL (ref 80–100)
MONOCYTE #: 0.1 TH/MM3 (ref 0–0.9)
MONOCYTES NFR BLD: 1.2 % (ref 0–8)
NEUTROPHILS # BLD AUTO: 8.3 TH/MM3 (ref 1.8–7.7)
NEUTROPHILS NFR BLD AUTO: 87.2 % (ref 16–70)
PLATELET # BLD: 283 TH/MM3 (ref 150–450)
PMV BLD AUTO: 6.6 FL (ref 7–11)
RBC # BLD AUTO: 4.38 MIL/MM3 (ref 4–5.3)
SODIUM SERPL-SCNC: 132 MEQ/L (ref 136–145)
WBC # BLD AUTO: 9.5 TH/MM3 (ref 4–11)

## 2018-02-03 RX ADMIN — CITALOPRAM SCH MG: 20 TABLET, FILM COATED ORAL at 12:05

## 2018-02-03 RX ADMIN — PRAVASTATIN SODIUM SCH MG: 40 TABLET ORAL at 12:05

## 2018-02-03 RX ADMIN — STANDARDIZED SENNA CONCENTRATE AND DOCUSATE SODIUM SCH TAB: 8.6; 5 TABLET, FILM COATED ORAL at 20:34

## 2018-02-03 RX ADMIN — PANTOPRAZOLE SODIUM SCH MG: 20 TABLET, DELAYED RELEASE ORAL at 12:04

## 2018-02-03 RX ADMIN — MORPHINE SULFATE PRN MG: 2 INJECTION, SOLUTION INTRAMUSCULAR; INTRAVENOUS at 10:35

## 2018-02-03 RX ADMIN — HEPARIN SODIUM SCH UNITS: 10000 INJECTION, SOLUTION INTRAVENOUS; SUBCUTANEOUS at 13:35

## 2018-02-03 RX ADMIN — MORPHINE SULFATE PRN MG: 2 INJECTION, SOLUTION INTRAMUSCULAR; INTRAVENOUS at 22:22

## 2018-02-03 RX ADMIN — Medication SCH ML: at 09:34

## 2018-02-03 RX ADMIN — MORPHINE SULFATE PRN MG: 2 INJECTION, SOLUTION INTRAMUSCULAR; INTRAVENOUS at 06:27

## 2018-02-03 RX ADMIN — MORPHINE SULFATE PRN MG: 2 INJECTION, SOLUTION INTRAMUSCULAR; INTRAVENOUS at 17:01

## 2018-02-03 RX ADMIN — STANDARDIZED SENNA CONCENTRATE AND DOCUSATE SODIUM SCH TAB: 8.6; 5 TABLET, FILM COATED ORAL at 09:35

## 2018-02-03 RX ADMIN — HEPARIN SODIUM SCH UNITS: 10000 INJECTION, SOLUTION INTRAVENOUS; SUBCUTANEOUS at 05:15

## 2018-02-03 RX ADMIN — Medication SCH ML: at 20:34

## 2018-02-03 RX ADMIN — SODIUM CHLORIDE SCH MLS/HR: 900 INJECTION INTRAVENOUS at 09:34

## 2018-02-03 NOTE — HHI.HP
__________________________________________________





HPI


Service


[facility]


Primary Care Physician


No Primary Care Physician


Admission Diagnosis





intractable pain, unable to walk


Diagnoses:  


(1) Effusion, left knee


Diagnosis:  Principal





(2) Inability to walk


Diagnosis:  Principal





Chief Complaint:  


Left knee pain


Travel History


International Travel<30 Days:  No


Contact w/Intl Traveler <30 Da:  No


Traveled to Known Affected Are:  No


History of Present Illness


This is a 86-year-old female with known history of hypertension, hyperlipidemia

, arthritis history of vertigo, history of syncope who presented to hospital 

because of left knee pain.  Patient states that she has been experiencing left 

knee pain which she described as severe shooting pain as a 10/10 on a pain 

scale.  Patient states the last few days she has been resting her knee with 

having elevated.  [place] [facility] [place] which he may try to 

help her get up yesterday but she could not bear weight because the pain was so 

severe.  Because he cannot ambulate they brought her to the hospital for 

evaluation.  Patient had workup done with x-ray that did show significant 

effusion in it was recommended by the ER physician that the patient be observed 

for further recommendations.  Patient indicates that her last surgery was 8 

years ago for her left knee replacement Dr. Monroe.  She indicates the last 

year she has 3 episodes of the same symptoms with increased pain, edema.  One 

episode right around Christmastime she was told that she had an infection and 

was started on steroids.  Her knee didn't improve.  She did have follow-up with 

Dr. Aki connolly in January in which he indicated that she was okay and we'll 

see her back again in 2 years.  At the present time she does have significant 

pain.  Unable to ambulate.





Review of Systems


Musculoskeletal:  COMPLAINS OF: Joint pain (left knee)


Except as stated in HPI:  all other systems reviewed are Neg





Past Family Social History


Past Medical History


Hypertension


Hyperlipidemia


Arthritis


History syncope


History of vertigo


Past Surgical History


Bilateral knee replacement


Breast biopsy


Appendectomy


Tonsillectomy


Cataract surgery


Melanoma removed from right arm


Reported Medications





Reported Meds & Active Scripts


Active


Reported


Losartan (Losartan Potassium) 25 Mg Tab 25 Mg PO DAILY


Citalopram (Citalopram Hydrobromide) 10 Mg Tab 10 Mg PO DAILY


Ocuvite (Multiple Vitamins W/ Minerals) 1 Tab 1 Tab PO DAILY


Calcium Magnesium Caplet (Calcium Carb,Gluc/Mag Ox,Gluc) 500 Mg Calcium-250 Mg 

Tablet 1 Tab PO DAILY


Omeprazole 20 Mg Tab 20 Mg PO DAILY


Lovastatin 40 Mg Tab 40 Mg PO DAILY


Potassium Chloride ER (Potassium Chloride) 10 Meq Cap 10 Meq PO EVERY OTHER DAY


Furosemide 20 Mg Tab 20 Mg PO EVERY OTHER DAY


Allergies:  


Coded Allergies:  


     diclofenac (Unverified  Allergy, Severe, SEVERE VOMITING, 18)


     paroxetine (Unverified  Allergy, Unknown, 18)


     sertraline (Unverified  Allergy, Unknown, 18)


     sulfamethoxazole (Unverified  Allergy, Unknown, 18)


     trimethoprim (Unverified  Allergy, Unknown, 18)


Uncoded Allergies:  


     PAIN MEDICATIONS (Allergy, Unknown, 17)


Family History


Reviewed is significant for both mother and father with heart disease


Social History


Patient denies any tobacco, alcohol or illicit drugs





Physical Exam


Vital Signs





Vital Signs








  Date Time  Temp Pulse Resp B/P (MAP) Pulse Ox O2 Delivery O2 Flow Rate FiO2


 


2/3/18 12:29    161/94 (116)    


 


2/3/18 08:00 96.4 87 17 191/81 (117) 98   


 


2/3/18 06:28  82 18 175/86 (115) 96 Nasal Cannula 2.00 


 


2/3/18 05:57  78 18 180/86 (117) 95 Nasal Cannula 2.00 


 


2/3/18 05:35  77 18 181/103 (129) 96 Nasal Cannula 2.00 


 


2/3/18 04:30   18     


 


2/3/18 03:54  74 18 151/71 (97) 95 Nasal Cannula 2.00 


 


2/3/18 02:35  72 18 136/57 (83) 96 Nasal Cannula 2.00 


 


2/3/18 02:25   18     


 


2/3/18 01:30  73 18 155/66 (95) 96 Nasal Cannula 2.00 


 


2/3/18 01:21   18     


 


2/3/18 01:05   18     


 


2/3/18 00:35   18     


 


2/3/18 00:16  69 18 177/75 (109) 94 Nasal Cannula 2.00 


 


2/3/18 00:12   18     


 


18 23:32   18     


 


18 23:24   18     


 


18 23:23  69 18 205/74 (117) 94 Room Air  


 


18 22:28    198/78 (118)    


 


18 22:06  89 18 202/77 (118) 97 Room Air  


 


18 18:38 98.3 72 16 189/88 (121) 95   








Physical Exam


GENERAL: Well-developed, well-nourished, in no acute distress. alert and 

orientated


HEENT: Head is normocephalic without any lesions or masses noted. Facial 

features are symmetric. Eyes: Pupils equal round reactive to light. Extraocular 

muscles are intact. Conjunctivae were clear. Oropharyngeal: Pharynx without any 

erythema edema. Tongue is midline without deviation. Buccal mucosa is moist 

without any masses or lesions


NECK: Supple without any masses. Trachea midline no deviation. No JVD, no 

bruits are appreciated


CARDIAC: Regular rhythm, regular rate. S1/S2 are heard.  2/6 holosystolic murmur

, no gallops or rubs.


LUNGS: Clear to auscultation bilaterally. No wheeze, rhonchi or rales. No use 

of accessory muscles on inspiration or expiration.


ABDOMEN: Soft, nontender. Nondistended. Bowel sounds heard in all 4 quadrants. 

No organomegaly or masses. Negative rebound, negative guarding


EXTREMITIES: No edema, pulses are equal bilaterally. No cyanosis or clubbing.  

Left knee with palpable tenderness noted over the medial inferior patellar 

region


NEUROLOGY: Mood and affect appear appropriate. Cranial nerves II through XII 

grossly intact. Muscle strength 5/5 in upper and lower extremities bilaterally. 

Deep tendon reflexes are 2+ in upper and lower extremities bilaterally.


Laboratory





Laboratory Tests








Test


  18


22:55 2/3/18


07:37


 


White Blood Count 16.0  9.5 


 


Red Blood Count 4.73  4.38 


 


Hemoglobin 13.4  12.5 


 


Hematocrit 40.3  37.3 


 


Mean Corpuscular Volume 85.4  85.0 


 


Mean Corpuscular Hemoglobin 28.4  28.6 


 


Mean Corpuscular Hemoglobin


Concent 33.3 


  33.6 


 


 


Red Cell Distribution Width 14.2  14.5 


 


Platelet Count 319  283 


 


Mean Platelet Volume 7.0  6.6 


 


Neutrophils (%) (Auto) 72.3  87.2 


 


Lymphocytes (%) (Auto) 19.8  10.0 


 


Monocytes (%) (Auto) 6.2  1.2 


 


Eosinophils (%) (Auto) 0.3  0.1 


 


Basophils (%) (Auto) 1.4  1.5 


 


Neutrophils # (Auto) 11.6  8.3 


 


Lymphocytes # (Auto) 3.2  1.0 


 


Monocytes # (Auto) 1.0  0.1 


 


Eosinophils # (Auto) 0.0  0.0 


 


Basophils # (Auto) 0.2  0.1 


 


CBC Comment DIFF FINAL  DIFF FINAL 


 


Differential Comment    


 


Erythrocyte Sedimentation Rate 11  


 


Blood Urea Nitrogen 16  14 


 


Creatinine 0.85  0.77 


 


Random Glucose 128  150 


 


Total Protein 7.6  


 


Albumin 3.6  


 


Calcium Level 9.4  8.8 


 


Alkaline Phosphatase 73  


 


Aspartate Amino Transf


(AST/SGOT) 15 


  


 


 


Alanine Aminotransferase


(ALT/SGPT) 12 


  


 


 


Total Bilirubin 0.6  


 


Sodium Level 132  132 


 


Potassium Level 4.2  4.0 


 


Chloride Level 98  97 


 


Carbon Dioxide Level 24.3  28.5 


 


Anion Gap 10  7 


 


Estimat Glomerular Filtration


Rate 63 


  71 


 








Result Diagram:  


2/3/18 0737                                                                    

            2/3/18 0737





Imaging





Last Impressions








Lower Extremity Ultrasound 18 Signed





Impressions: 





 Service Date/Time:  2018 21:17 - CONCLUSION: No evidence 

of 





 deep venous thrombosis. Popliteal cyst on the left measuring 3.9 x 2.5 x 2.1 

cm. 





     Santy Montoya MD 


 


Knee X-Ray 18 Signed





Impressions: 





 Service Date/Time:  2018 20:34 - CONCLUSION:  Large 





 suprapatellar knee joint effusion.     MD Lauren Careyi VTE Risk Assessment


Caprini VTE Risk Assessment:  Mod/High Risk (score >= 2)


Caprini Risk Assessment Model











 Point Value = 1          Point Value = 2  Point Value = 3  Point Value = 5


 


Age 41-60


Minor surgery


BMI > 25 kg/m2


Swollen legs


Varicose veins


Pregnancy or postpartum


History of unexplained or recurrent


   spontaneous 


Oral contraceptives or hormone


   replacement


Sepsis (< 1 month)


Serious lung disease, including


   pneumonia (< 1 month)


Abnormal pulmonary function


Acute myocardial infarction


Congestive heart failure (< 1 month)


History of inflammatory bowel disease


Medical patient at bed rest Age 61-74


Arthroscopic surgery


Major open surgery (> 45 min)


Laparoscopic surgery (> 45 min)


Malignancy


Confined to bed (> 72 hours)


Immobilizing plaster cast


Central venous access Age >= 75


History of VTE


Family history of VTE


Factor V Leiden


Prothrombin 58929V


Lupus anticoagulant


Anticardiolipin antibodies


Elevated serum homocysteine


Heparin-induced thrombocytopenia


Other congenital or acquired


   thrombophilia Stroke (< 1 month)


Elective arthroplasty


Hip, pelvis, or leg fracture


Acute spinal cord injury (< 1 month)








Prophylaxis Regimen











   Total Risk


Factor Score Risk Level Prophylaxis Regimen


 


0-1      Low Early ambulation


 


2 Moderate Order ONE of the following:


*Sequential Compression Device (SCD)


*Heparin 5000 units SQ BID


 


3-4 Higher Order ONE of the following medications:


*Heparin 5000 units SQ TID


*Enoxaparin/Lovenox 40 mg SQ daily (WT < 150 kg, CrCl > 30 mL/min)


*Enoxaparin/Lovenox 30 mg SQ daily (WT < 150 kg, CrCl > 10-29 mL/min)


*Enoxaparin/Lovenox 30 mg SQ BID (WT < 150 kg, CrCl > 30 mL/min)


AND/OR


*Sequential Compression Device (SCD)


 


5 or more Highest Order ONE of the following medications:


*Heparin 5000 units SQ TID (Preferred with Epidurals)


*Enoxaparin/Lovenox 40 mg SQ daily (WT < 150 kg, CrCl > 30 mL/min)


*Enoxaparin/Lovenox 30 mg SQ daily (WT < 150 kg, CrCl > 10-29 mL/min)


*Enoxaparin/Lovenox 30 mg SQ BID (WT < 150 kg, CrCl > 30 mL/min)


AND


*Sequential Compression Device (SCD)











Assessment and Plan


Assessment and Plan


Left knee pain with large suprapatellar joint effusion


   X-ray indicates large joint effusion, ultrasound does not indicate any DVT


   Orthopedist was consulted for recommendations.  Dr. Jo indicating that 

patient will require aspiration by radiology due to patient has an history of 

complete knee replacement


   Dr. Jo requesting the patient be transferred to the main hospital so 

radiology can do procedure


   Radiologist consulted for knee aspiration.  Fluid should be sent for culture 

and Gram stain, crystals, cell count


   Patient started on empirical Rocephin


   Continue pain control


   Physical therapy evaluation





Hypertension, hyperlipidemia


   Home medications have been continued


   Vasotec, clonidine as needed





DVT prevention


   Subcutaneous heparin











Jason Zapata Feb 3, 2018 13:25

## 2018-02-04 VITALS
DIASTOLIC BLOOD PRESSURE: 56 MMHG | SYSTOLIC BLOOD PRESSURE: 113 MMHG | TEMPERATURE: 96.5 F | HEART RATE: 69 BPM | RESPIRATION RATE: 18 BRPM | OXYGEN SATURATION: 98 %

## 2018-02-04 VITALS
HEART RATE: 67 BPM | DIASTOLIC BLOOD PRESSURE: 60 MMHG | SYSTOLIC BLOOD PRESSURE: 124 MMHG | OXYGEN SATURATION: 99 % | TEMPERATURE: 97.3 F | RESPIRATION RATE: 16 BRPM

## 2018-02-04 VITALS
RESPIRATION RATE: 12 BRPM | DIASTOLIC BLOOD PRESSURE: 64 MMHG | HEART RATE: 71 BPM | OXYGEN SATURATION: 100 % | TEMPERATURE: 97.6 F | SYSTOLIC BLOOD PRESSURE: 149 MMHG

## 2018-02-04 VITALS
DIASTOLIC BLOOD PRESSURE: 80 MMHG | OXYGEN SATURATION: 98 % | TEMPERATURE: 97.4 F | RESPIRATION RATE: 20 BRPM | SYSTOLIC BLOOD PRESSURE: 128 MMHG | HEART RATE: 75 BPM

## 2018-02-04 VITALS
DIASTOLIC BLOOD PRESSURE: 62 MMHG | TEMPERATURE: 98.7 F | RESPIRATION RATE: 14 BRPM | SYSTOLIC BLOOD PRESSURE: 136 MMHG | HEART RATE: 73 BPM | OXYGEN SATURATION: 98 %

## 2018-02-04 LAB
BASOPHILS # BLD AUTO: 0.1 TH/MM3 (ref 0–0.2)
BASOPHILS NFR BLD: 0.8 % (ref 0–2)
BUN SERPL-MCNC: 19 MG/DL (ref 7–18)
CALCIUM SERPL-MCNC: 8.6 MG/DL (ref 8.5–10.1)
CHLORIDE SERPL-SCNC: 100 MEQ/L (ref 98–107)
CREAT SERPL-MCNC: 0.72 MG/DL (ref 0.5–1)
EOSINOPHIL # BLD: 0 TH/MM3 (ref 0–0.4)
EOSINOPHIL NFR BLD: 0.5 % (ref 0–4)
ERYTHROCYTE [DISTWIDTH] IN BLOOD BY AUTOMATED COUNT: 14.6 % (ref 11.6–17.2)
GFR SERPLBLD BASED ON 1.73 SQ M-ARVRAT: 77 ML/MIN (ref 89–?)
GLUCOSE SERPL-MCNC: 95 MG/DL (ref 74–106)
HCO3 BLD-SCNC: 28 MEQ/L (ref 21–32)
HCT VFR BLD CALC: 34.5 % (ref 35–46)
HGB BLD-MCNC: 10.9 GM/DL (ref 11.6–15.3)
LYMPHOCYTES # BLD AUTO: 2 TH/MM3 (ref 1–4.8)
LYMPHOCYTES NFR BLD AUTO: 21.2 % (ref 9–44)
MCH RBC QN AUTO: 27.3 PG (ref 27–34)
MCHC RBC AUTO-ENTMCNC: 31.7 % (ref 32–36)
MCV RBC AUTO: 86.3 FL (ref 80–100)
MONOCYTE #: 1 TH/MM3 (ref 0–0.9)
MONOCYTES NFR BLD: 10.1 % (ref 0–8)
NEUTROPHILS # BLD AUTO: 6.4 TH/MM3 (ref 1.8–7.7)
NEUTROPHILS NFR BLD AUTO: 67.4 % (ref 16–70)
PLATELET # BLD: 300 TH/MM3 (ref 150–450)
PMV BLD AUTO: 6.9 FL (ref 7–11)
RBC # BLD AUTO: 4 MIL/MM3 (ref 4–5.3)
SODIUM SERPL-SCNC: 134 MEQ/L (ref 136–145)
WBC # BLD AUTO: 9.5 TH/MM3 (ref 4–11)

## 2018-02-04 RX ADMIN — HEPARIN SODIUM SCH UNITS: 10000 INJECTION, SOLUTION INTRAVENOUS; SUBCUTANEOUS at 16:34

## 2018-02-04 RX ADMIN — STANDARDIZED SENNA CONCENTRATE AND DOCUSATE SODIUM SCH TAB: 8.6; 5 TABLET, FILM COATED ORAL at 20:46

## 2018-02-04 RX ADMIN — STANDARDIZED SENNA CONCENTRATE AND DOCUSATE SODIUM SCH TAB: 8.6; 5 TABLET, FILM COATED ORAL at 09:23

## 2018-02-04 RX ADMIN — MORPHINE SULFATE PRN MG: 2 INJECTION, SOLUTION INTRAMUSCULAR; INTRAVENOUS at 09:22

## 2018-02-04 RX ADMIN — HEPARIN SODIUM SCH UNITS: 10000 INJECTION, SOLUTION INTRAVENOUS; SUBCUTANEOUS at 05:18

## 2018-02-04 RX ADMIN — PANTOPRAZOLE SODIUM SCH MG: 20 TABLET, DELAYED RELEASE ORAL at 09:23

## 2018-02-04 RX ADMIN — PRAVASTATIN SODIUM SCH MG: 40 TABLET ORAL at 09:22

## 2018-02-04 RX ADMIN — Medication SCH ML: at 09:23

## 2018-02-04 RX ADMIN — CITALOPRAM SCH MG: 20 TABLET, FILM COATED ORAL at 09:22

## 2018-02-04 RX ADMIN — Medication SCH ML: at 20:46

## 2018-02-04 RX ADMIN — LOSARTAN POTASSIUM SCH MG: 25 TABLET, FILM COATED ORAL at 09:23

## 2018-02-04 RX ADMIN — SODIUM CHLORIDE SCH MLS/HR: 900 INJECTION INTRAVENOUS at 09:22

## 2018-02-04 NOTE — HHI.PR
Subjective


Remarks


Patient seen and examined today for follow-up on left knee pain, edema, unable 

to ambulate.  Patient states that the pain has improved after the aspiration.  

Vital signs are stable, afebrile





Objective





Vital Signs








  Date Time  Temp Pulse Resp B/P (MAP) Pulse Ox O2 Delivery O2 Flow Rate FiO2


 


2/4/18 00:00 96.5 69 18 113/56 (75) 98   


 


2/3/18 20:00 96.2 79 18 132/59 (83) 93   


 


2/3/18 14:56 98.2 74 18 141/77 (98) 99   


 


2/3/18 12:29    161/94 (116)    














I/O      


 


 2/3/18 2/3/18 2/3/18 2/4/18 2/4/18 2/4/18





 07:00 15:00 23:00 07:00 15:00 23:00


 


Intake Total  100 ml  120 ml  


 


Balance  100 ml  120 ml  


 


      


 


Intake Oral    120 ml  


 


IV Total  100 ml    


 


# Voids    1  








Result Diagram:  


2/4/18 0642                                                                    

            2/4/18 0642





Imaging





Last Impressions








Lower Extremity Ultrasound 2/2/18 2004 Signed





Impressions: 





 Service Date/Time:  Friday, February 2, 2018 21:17 - CONCLUSION: No evidence 

of 





 deep venous thrombosis. Popliteal cyst on the left measuring 3.9 x 2.5 x 2.1 

cm. 





     Santy Montoya MD 


 


Knee X-Ray 2/2/18 2004 Signed





Impressions: 





 Service Date/Time:  Friday, February 2, 2018 20:34 - CONCLUSION:  Large 





 suprapatellar knee joint effusion.     Santy Montoya MD 








Procedures


2/3/18 Left knee effusion aspiration


Objective Remarks


GENERAL: Well-developed, well-nourished, in no acute distress. alert and 

orientated


HEENT: Head is normocephalic without any lesions or masses noted. Facial 

features are symmetric. Eyes: Extraocular muscles are intact. Conjunctivae were 

clear. 


NECK: Supple without any masses. Trachea midline no deviation. No JVD, 


CARDIAC: Regular rhythm, regular rate. S1/S2 are heard.  2/6 holosystolic murmur

, no gallops or rubs.


LUNGS: Clear to auscultation bilaterally. No wheeze, rhonchi or rales. No use 

of accessory muscles on inspiration or expiration.


ABDOMEN: Soft, nontender. Nondistended. Bowel sounds heard in all 4 quadrants. 

No organomegaly or masses. Negative rebound, negative guarding


EXTREMITIES: No edema, pulses are equal bilaterally. No cyanosis or clubbing.  

Left knee with palpable tenderness noted over the medial inferior patellar 

region


NEUROLOGY: Mood and affect appear appropriate. Cranial nerves II through XII 

grossly intact. Moving all extremities, speech is clear





A/P


Assessment and Plan


Left knee pain with large suprapatellar joint effusion


   X-ray indicates large joint effusion, ultrasound does not indicate any DVT


   Orthopedist was consulted for recommendations.  Dr. Jo indicating that 

patient will require aspiration by radiology due to patient has an H/O complete 

knee replacement


   S/P knee aspiration by radiology


   Fluid should be sent for culture and Gram stain, crystals, cell count


   Patient started on empirical Rocephin


   Continue pain control


   Physical therapy evaluation, recommending Rehab


   Discussed with orthopedist who indicated that could be a hematoma.  Continue 

monitor culture and if any growth will require surgery, otherwise conservative 

management





Hypertension, hyperlipidemia


   Home medications have been continued


   Vasotec, clonidine as needed





DVT prevention


   Subcutaneous heparin


Discharge Planning


Discharge planning in 24-48 hours depending on culture report in patient's 

response to treatment











Jason Zapata Feb 4, 2018 08:09

## 2018-02-04 NOTE — MB
cc:

SHIRLEY MARKHAM

****

 

 

DATE OF CONSULTATION:  02/04/2018.

 

REASON FOR CONSULTATION:

Left knee pain.

 

HISTORY OF PRESENT ILLNESS:

The patient is an 86-year-old female who underwent a left total knee

arthroplasty which sounds uncomplicated from Dr. MADIHA Monroe.  The patient

says that the surgery itself was successful and has reduced pain. She has had

routine follow up with Dr. Monroe, the most recent was in January of this

year. The patient says that she has had some recent problems with the knee,

specifically she said that she started developing pain around the knee in

December of 2017.  The patient went to an urgent care and she was told that

there was an infection and was given steroids.  She says that she was not given

any antibiotics. She says the steroids did help the knee in the past.  The

patient at the most recent follow up with her orthopedic surgeon said that he

would see her back in two years as they did not find anything overtly wrong.

The patient says that she has developed swelling and pain about the knee

recently.  She denies any fevers or drainage about the knee.  The patient

denies having specific type of injury.  She denies being on a blood thinner.

 

The patient was admitted to [facility]. We reviewed the

images.  I did recommend to have the knee aspirated by the radiologist, which

was completed.  The radiologist contacted me on the phone and told me they were

only able to get a small amount of fluid out and that there were loculations

within the knee but the fluid generally looked fairly bloody. This was sent for

a cell count, Gram stain, culture.

 

REVIEW OF SYSTEMS:

A twelve-point review of systems is negative except as noted in the history of

present illness.

 

PAST MEDICAL HISTORY:

1.  Hypertension.

2. Hyperlipidemia.

3. Arthritis.

4. Syncope.

5. Vertigo.

 

PAST SURGICAL HISTORY:

1.  Bilateral knee replacement.

2. Breast biopsy.

3. Appendectomy.

4. Tonsillectomy.

5. Cataract surgery.

6. Melanoma.

 

MEDICATIONS:

See the chart.

 

ALLERGIES:

SEE THE CHART.

 

SOCIAL HISTORY:

The patient does not smoke or drink alcohol.

 

FAMILY HISTORY:

Family history is noncontributory.

 

PHYSICAL EXAMINATION:

VITAL SIGNS:  Vitals show T-max of 98.2 over the last 24 hours. Her pulse is

67, respirations 16, blood pressure 124/60, pulse oximetry is 99%.

GENERAL:  She is Awake, alert and oriented times three.  She has normal affect,

insight and judgment. She has a friend at the bedside. She is in no acute

distress.  No diaphoresis.

HEAD, EYES, EARS, NOSE, THROAT: Her head is atraumatic. Oropharynx is moist.

Extraocular muscles are intact.

NECK: The neck is supple.

HEART: Regular rate and rhythm.

LUNGS:  The lungs show no audible wheeze with normal inspiratory effort.

BACK: No costovertebral angle tenderness.

ABDOMEN: The abdomen is soft, nontender and nondistended.

EXTREMITIES: Examination of bilateral upper extremities shows good range of

motion and normal alignment.

 

Examination of the left lower extremity shows that she has a well-healed

anterior incision.  There are a couple of band-aids on the knee from the

aspiration.  There is generalized warmth about the leg but no focal warmth

about the knee specifically. There is no fluctuance.  It is actually difficult

to tell whether she has an effusion or not because she does have some obese

body habitus.  Knee range of motion is limited. She can fully extend the knee

to 0 degrees. She does have some weakness generalized about the leg but this

could be more generalized about the lower extremities.  She moves the toes well

on the left foot.  She has 2+ dorsalis pedis pulse. There is no erythema noted

about the left leg.

 

LABORATORY STUDIES:

Laboratory studies show that the patient initially on February 2nd had a white

cell count of 16 and a neutrophil elevation of 72.3% but then today her white

cell count is down to 9.5 with no shift in the neutrophils at 67.4.

 

Her erythrocyte sedimentation rate is only 11 with the upper limit of normal

being 30.

 

Aspiration results showing aspiration of the left knee shows white cell count

of 19,200 and synovial red blood cell count of 3,756,000 with synovial

neutrophils of 76.  There were no crystals.

 

Microbiology shows gram stain and cultures are pending.

 

IMAGING STUDIES:

X-rays are reviewed.  I reviewed the report as well. She has a left total knee

arthroplasty which is in good position.  No obvious complications are noted

such as definitive signs of loosening or fracture.  She does have what appears

to be a large joint effusion.

 

IMPRESSION:

1. Left knee status post total knee arthroplasty performed by Dr. Monroe in

the year 2000 with routine follow up by that orthopedic surgeon.

2. Left knee recent swelling, possibly hemarthrosis based on the aspiration,

rule out septic knee.

 

DECISION-MAKING:

I discussed the diagnosis with the patient. Based on the laboratory analysis,

she does have an increased white cell count in the fluid; however, there are

many more red cells than white cells.  Her laboratory values are somewhat

suspicious given the fact that she had an elevated white count, although it

seemed to drop very rapidly with a normal erythrocyte sedimentation rate, which

would be unusual to have a normal erythrocyte sedimentation rate  if this was a

septic joint.

 

At this point, I still have a somewhat low suspicion for an infected total

knee, although it is still possible there could be a low level infection within

the joint.  I do feel it is appropriate for the admitting physician to follow

up the finalized cultures. If ultimately the cultures do come back positive for

infection, then I do  recommend for them to consult Dr. Monroe as soon as

possible for management of the knee replacement.

 

At this point, I do not see need for acute emergent surgical management for the

left knee.  If the cultures do come back negative, I do recommend for the

patient to follow up with Dr. Monroe as an outpatient, and I did express

this to the patient that since she is having some complications associated with

the knee replacement, it would be appropriate for the indexed physician to

follow this and help with definitive management.

 

Thank you for allowing me to participate in the care of this patient.

 

 

 

 

                              _________________________________

                              Shirley Markham MD

 

 

 

/FELIX

D:  2/4/2018/1:28 PM

T:  2/4/2018/2:31 PM

Visit #:  [id number]

Job #:  [id number]

## 2018-02-05 VITALS
DIASTOLIC BLOOD PRESSURE: 53 MMHG | RESPIRATION RATE: 20 BRPM | OXYGEN SATURATION: 96 % | TEMPERATURE: 97.5 F | SYSTOLIC BLOOD PRESSURE: 122 MMHG | HEART RATE: 73 BPM

## 2018-02-05 VITALS
DIASTOLIC BLOOD PRESSURE: 74 MMHG | HEART RATE: 75 BPM | RESPIRATION RATE: 14 BRPM | SYSTOLIC BLOOD PRESSURE: 181 MMHG | TEMPERATURE: 96.6 F | OXYGEN SATURATION: 99 %

## 2018-02-05 VITALS
HEART RATE: 75 BPM | TEMPERATURE: 97.3 F | RESPIRATION RATE: 20 BRPM | SYSTOLIC BLOOD PRESSURE: 139 MMHG | OXYGEN SATURATION: 99 % | DIASTOLIC BLOOD PRESSURE: 72 MMHG

## 2018-02-05 VITALS
OXYGEN SATURATION: 100 % | DIASTOLIC BLOOD PRESSURE: 58 MMHG | SYSTOLIC BLOOD PRESSURE: 129 MMHG | RESPIRATION RATE: 18 BRPM | HEART RATE: 69 BPM | TEMPERATURE: 96.9 F

## 2018-02-05 PROCEDURE — 0S9D3ZZ DRAINAGE OF LEFT KNEE JOINT, PERCUTANEOUS APPROACH: ICD-10-PCS | Performed by: RADIOLOGY

## 2018-02-05 RX ADMIN — PANTOPRAZOLE SODIUM SCH MG: 20 TABLET, DELAYED RELEASE ORAL at 08:49

## 2018-02-05 RX ADMIN — STANDARDIZED SENNA CONCENTRATE AND DOCUSATE SODIUM SCH TAB: 8.6; 5 TABLET, FILM COATED ORAL at 08:49

## 2018-02-05 RX ADMIN — CITALOPRAM SCH MG: 20 TABLET, FILM COATED ORAL at 08:50

## 2018-02-05 RX ADMIN — PRAVASTATIN SODIUM SCH MG: 40 TABLET ORAL at 08:49

## 2018-02-05 RX ADMIN — SODIUM CHLORIDE SCH MLS/HR: 900 INJECTION INTRAVENOUS at 08:48

## 2018-02-05 RX ADMIN — LOSARTAN POTASSIUM SCH MG: 25 TABLET, FILM COATED ORAL at 08:49

## 2018-02-05 RX ADMIN — Medication SCH ML: at 08:47

## 2018-02-05 RX ADMIN — HEPARIN SODIUM SCH UNITS: 10000 INJECTION, SOLUTION INTRAVENOUS; SUBCUTANEOUS at 17:02

## 2018-02-05 RX ADMIN — MORPHINE SULFATE PRN MG: 2 INJECTION, SOLUTION INTRAMUSCULAR; INTRAVENOUS at 06:49

## 2018-02-05 RX ADMIN — HEPARIN SODIUM SCH UNITS: 10000 INJECTION, SOLUTION INTRAVENOUS; SUBCUTANEOUS at 04:59

## 2018-02-05 NOTE — HHI.DS
__________________________________________________





Discharge Summary


Admission Date


Feb 4, 2018 at 10:50


Discharge Date:  Feb 5, 2018


Admitting Diagnosis





intractable pain, unable to walk





(1) Effusion, left knee


ICD Code:  M25.462 - Effusion, left knee


Diagnosis:  Principal





(2) Inability to walk


ICD Code:  R26.2 - Difficulty in walking, not elsewhere classified


Diagnosis:  Principal


Status:  Acute


Procedures


Left knee effusion aspiration by radiology


Brief History - From Admission


This is a 86-year-old female with known history of hypertension, hyperlipidemia

, arthritis history of vertigo, history of syncope who presented to hospital 

because of left knee pain.  Patient states that she has been experiencing left 

knee pain which she described as severe shooting pain as a 10/10 on a pain 

scale.  Patient states the last few days she has been resting her knee with 

having elevated.  She [place] [facility] [place] in which he may try to 

help her get up yesterday but she could not bear weight because the pain was so 

severe.  Because he cannot ambulate they brought her to the hospital for 

evaluation.  Patient had workup done with x-ray that did show significant 

effusion in it was recommended by the ER physician that the patient be observed 

for further recommendations.  Patient indicates that her last surgery was 8 

years ago for her left knee replacement Dr. Monroe.  She indicates the last 

year she has 3 episodes of the same symptoms with increased pain, edema.  One 

episode right around Christmastime she was told that she had an infection and 

was started on steroids.  Her knee didn't improve.  She did have follow-up with 

Dr. Prabhakar sent in January in which he indicated that she was okay and we'll 

see her back again in 2 years.  At the present time she does have significant 

pain.  Unable to ambulate.


CBC/BMP:  


2/4/18 0642                                                                    

            2/4/18 0642





Significant Findings





Laboratory Tests








Test


  2/2/18


22:55 2/3/18


07:37 2/3/18


15:50 2/4/18


06:42


 


White Blood Count


  16.0 TH/MM3


(4.0-11.0) 


  


  


 


 


Neutrophils (%) (Auto)


  72.3 %


(16.0-70.0) 87.2 %


(16.0-70.0) 


  


 


 


Neutrophils # (Auto)


  11.6 TH/MM3


(1.8-7.7) 8.3 TH/MM3


(1.8-7.7) 


  


 


 


Monocytes # (Auto)


  1.0 TH/MM3


(0-0.9) 


  


  1.0 TH/MM3


(0-0.9)


 


Random Glucose


  128 MG/DL


() 150 MG/DL


() 


  


 


 


Sodium Level


  132 MEQ/L


(136-145) 132 MEQ/L


(136-145) 


  134 MEQ/L


(136-145)


 


Estimat Glomerular Filtration


Rate 63 ML/MIN


(>89) 71 ML/MIN


(>89) 


  77 ML/MIN


(>89)


 


Mean Platelet Volume


  


  6.6 FL


(7.0-11.0) 


  6.9 FL


(7.0-11.0)


 


Chloride Level


  


  97 MEQ/L


() 


  


 


 


Synovial Fluid Color   RED (STRAW)  


 


Synovial Fluid Appearance   BLOODY (CLEAR)  


 


Synovial Fluid WBC


  


  


  87295 /MM3


(0-200) 


 


 


Synovial Fluid RBC


  


  


  0461104 /MM3


(0-0) 


 


 


Synovial Fluid Neutrophils   78 % (0-25)  


 


Hemoglobin


  


  


  


  10.9 GM/DL


(11.6-15.3)


 


Hematocrit


  


  


  


  34.5 %


(35.0-46.0)


 


Mean Corpuscular Hemoglobin


Concent 


  


  


  31.7 %


(32.0-36.0)


 


Monocytes (%) (Auto)


  


  


  


  10.1 %


(0.0-8.0)


 


Blood Urea Nitrogen


  


  


  


  19 MG/DL


(7-18)








Imaging





Last Impressions








Aspiration 2/3/18 0000 Signed





Impressions: 





 Service Date/Time:  Saturday, February 3, 2018 15:06 - CONCLUSION: 

Uncomplicated 





 aspiration as above.     Alexander Grajeda MD 


 


Lower Extremity Ultrasound 2/2/18 2004 Signed





Impressions: 





 Service Date/Time:  Friday, February 2, 2018 21:17 - CONCLUSION: No evidence 

of 





 deep venous thrombosis. Popliteal cyst on the left measuring 3.9 x 2.5 x 2.1 

cm. 





     Santy Montoya MD 


 


Knee X-Ray 2/2/18 2004 Signed





Impressions: 





 Service Date/Time:  Friday, February 2, 2018 20:34 - CONCLUSION:  Large 





 suprapatellar knee joint effusion.     Santy Montoya MD 








Hospital Course


86-year-old  female who originally presented to the hospital on 2/3/18 

because she is having significant pain in her left knee with swelling.  Patient 

had workup done in the emergency department and found to have a rather large 

joint effusion.  Patient is complicated by total knee replacement.  Patient was 

admitted to the hospital with orthopedic consultation.  Orthopedist recommended 

radiology perform joint aspiration secondary to complexity of her total knee 

replacement.  Intervention radiology did perform the aspiration which did 

indicate hemarthrosis.  Culture was taken which did not show any organism and 

is no growth for 24 hours at this time.  Patient was started on empirical 

antibiotics include cefepime which has been discontinued at this time.  Patient 

indicates that she has had previous episodes of this knee pain and effusion.  

Patient just saw her regular orthopedist Dr. Monroe in January who is clear 

or two-year follow-up.  However in light of the patient's new hemarthrosis.  

Patient will need close follow-up in outpatient setting.  At present time 

physical therapy has evaluated patient and indicated that the patient can 

return home with home health care.  Patient clinically stable this time.  Will 

plan discharge home accordingly.


Pt Condition on Discharge:  Stable


Discharge Disposition:  [facility] with [facility]


Discharge Time:  > 30 minutes


Discharge Instructions


DIET: Follow Instructions for:  Heart Healthy Diet


Activities you can perform:  Regular-No Restrictions


Activities to Avoid:  Driving for 24 hrs


Follow up Referrals:  


Orthopedics - 1 Week with ROBERTO Monroe MD ([place])


PCP Follow-up - 1 Week





New Medications:  


Wheelchair (Wheelchair) 1 Mis Mis


EA .XX AS DIRECTED, #1 0 Refills





 


Continued Medications:  


Calcium Carb,Gluc/Mag Ox,Gluc (Calcium Magnesium Caplet) 500 Mg Calcium-250 Mg 

Tablet


1 TAB PO DAILY





Citalopram (Citalopram) 10 Mg Tab


10 MG PO DAILY for Control Depression, #30 TAB 0 Refills





Furosemide (Furosemide) 20 Mg Tab


20 MG PO EVERY OTHER DAY, #30 TAB 0 Refills





Losartan (Losartan) 25 Mg Tab


25 MG PO DAILY for Blood Pressure Management, #30 TAB 0 Refills





Lovastatin (Lovastatin) 40 Mg Tab


40 MG PO DAILY for Cholesterol Management, #30 TAB 0 Refills





Multiple Vitamins W/ Minerals (Ocuvite) 1 Tab


1 TAB PO DAILY for Nutritional Supplement, TAB 0 Refills





Omeprazole (Omeprazole) 20 Mg Tab


20 MG PO DAILY, #30 TAB 0 Refills





Potassium Chloride ER (Potassium Chloride ER) 10 Meq Cap


10 MEQ PO EVERY OTHER DAY for Electrolyte Replacement, #30 CAP 0 Refills

















Jason Zapata Feb 5, 2018 12:56

## 2018-02-05 NOTE — HHI.DCPOC
Discharge Care Plan


Diagnosis:  


(1) Effusion, left knee


(2) Inability to walk


Goals to Promote Your Health


* To prevent worsening of your condition and complications


* To maintain your health at the optimal level


Directions to Meet Your Goals


*** Take your medications as prescribed


*** Follow your dietary instruction


*** Follow activity as directed








*** Keep your appointments as scheduled


*** Take your immunizations and boosters as scheduled


*** If your symptoms worsen call your PCP, if no PCP go to Urgent Care Center 

or Emergency Room***


*** Smoking is Dangerous to Your Health. Avoid second hand smoke***


***Call the 24-hour hour crisis hotline for domestic abuse at 1-834.758.9832***











Jason Zapata Feb 5, 2018 11:25

## 2018-02-05 NOTE — RADRPT
EXAM DATE/TIME:  02/03/2018 15:06 

 

[facility] COMPARISON:  

No previous studies available for comparison.

                     

 

INDICATIONS :      

Patient with 8 yr old knee replacment in extreme pain 10/10.Swelling and can't bear weight.

                     

 

MEDICAL HISTORY :     

1. HTN

2. hyperlipidemia

3. vertigo

4. arthritis

 

SURGICAL HISTORY :     

1. bilateral knee replacements

2.breast bx

3. appendectomy

4. tonsillectomy

5. cataract surgery

6. melanoma

 

ENCOUNTER:     

Initial

 

ACUITY:     

1 week

 

PAIN SCORE:     

10/10

 

LOCATION:      

Left  knee

                     

 

FLUORO TIME:     

1.5 minutes

 

IMAGE SERIES:      

                     

 

      

DEVICE(S):      

18 gauge needle was placed into the left knee joint.

 

 

RESPONSE:  

Pre procedure pain level was 10/10

 

 

FLUID:  

Total volume of1 cc of cloudy red fluid was removed.

Fluid specimen was submitted to the lab for evaluation.

 

 

PROCEDURE :     

1.  Fluoroscopically guided left knee aspiration.

 

The risks, benefits and alternatives to the procedure were explained and verbal and written consent w
as obtained.  The site was prepped in sterile fashion.  Full sterile technique was used, including ca
p, mask, sterile gloves and gown and a large sterile sheet.  Hand hygiene and 2% chlorhexidine and/or
 betadine/alcohol prep was utilized per protocol for cutaneous antisepsis.  The skin and subcutaneous
 tissues were infiltrated with local anesthetic solution.

 

The patient tolerated the procedure well and there were no complications. 

 

CONCLUSION:     Uncomplicated aspiration as above.

 

 

 

 Alexander Grajeda MD on February 05, 2018 at 9:23           

Board Certified Radiologist.

 This report was verified electronically.

## 2018-02-05 NOTE — HHI.FF
Face to Face Verification


Diagnosis:  


(1) Effusion, left knee


(2) Knee pain, left


(3) Inability to walk


Physical Therapy


Order:  Evaluate and Treat, Improve ambulation, Strength and gait training





Home Health Nursing








Order: Medical education





 Signs/symptoms of disease process





 Nursing assessment with vital signs

















I have seen patient Marlene Elizabeth on 2/5/18. My clinical findings 

support the need for the requested home health care services because:








 Deconditioned w/ increased weakness





 Limited ability to care for self














I certify that my clinical findings support that this patient is homebound 

because:








 Unsteady gait/balance





 Unsafe to leave home unassisted

















Jason Zapata Feb 5, 2018 12:53

## 2018-02-24 ENCOUNTER — HOSPITAL ENCOUNTER (OUTPATIENT)
Dept: HOSPITAL 17 - PHED | Age: 83
Setting detail: OBSERVATION
LOS: 4 days | Discharge: HOME | End: 2018-02-28
Attending: HOSPITALIST | Admitting: HOSPITALIST
Payer: MEDICARE

## 2018-02-24 VITALS — WEIGHT: 160.06 LBS | BODY MASS INDEX: 26.67 KG/M2 | HEIGHT: 65 IN

## 2018-02-24 VITALS
DIASTOLIC BLOOD PRESSURE: 78 MMHG | RESPIRATION RATE: 16 BRPM | OXYGEN SATURATION: 99 % | SYSTOLIC BLOOD PRESSURE: 178 MMHG | HEART RATE: 74 BPM

## 2018-02-24 VITALS
OXYGEN SATURATION: 98 % | TEMPERATURE: 96.9 F | HEART RATE: 78 BPM | SYSTOLIC BLOOD PRESSURE: 186 MMHG | RESPIRATION RATE: 20 BRPM | DIASTOLIC BLOOD PRESSURE: 83 MMHG

## 2018-02-24 VITALS — DIASTOLIC BLOOD PRESSURE: 74 MMHG | SYSTOLIC BLOOD PRESSURE: 169 MMHG

## 2018-02-24 VITALS
RESPIRATION RATE: 20 BRPM | SYSTOLIC BLOOD PRESSURE: 212 MMHG | DIASTOLIC BLOOD PRESSURE: 81 MMHG | TEMPERATURE: 98.5 F | HEART RATE: 68 BPM | OXYGEN SATURATION: 100 %

## 2018-02-24 VITALS
DIASTOLIC BLOOD PRESSURE: 76 MMHG | HEART RATE: 79 BPM | RESPIRATION RATE: 16 BRPM | OXYGEN SATURATION: 96 % | SYSTOLIC BLOOD PRESSURE: 162 MMHG

## 2018-02-24 DIAGNOSIS — Z96.653: ICD-10-CM

## 2018-02-24 DIAGNOSIS — M19.90: ICD-10-CM

## 2018-02-24 DIAGNOSIS — I10: ICD-10-CM

## 2018-02-24 DIAGNOSIS — K21.9: ICD-10-CM

## 2018-02-24 DIAGNOSIS — R94.31: ICD-10-CM

## 2018-02-24 DIAGNOSIS — E78.00: ICD-10-CM

## 2018-02-24 DIAGNOSIS — Z79.899: ICD-10-CM

## 2018-02-24 DIAGNOSIS — M25.562: ICD-10-CM

## 2018-02-24 DIAGNOSIS — M71.22: ICD-10-CM

## 2018-02-24 DIAGNOSIS — M25.462: Primary | ICD-10-CM

## 2018-02-24 DIAGNOSIS — H91.93: ICD-10-CM

## 2018-02-24 PROCEDURE — 96374 THER/PROPH/DIAG INJ IV PUSH: CPT

## 2018-02-24 PROCEDURE — 97116 GAIT TRAINING THERAPY: CPT

## 2018-02-24 PROCEDURE — 96372 THER/PROPH/DIAG INJ SC/IM: CPT

## 2018-02-24 PROCEDURE — 99285 EMERGENCY DEPT VISIT HI MDM: CPT

## 2018-02-24 PROCEDURE — 97110 THERAPEUTIC EXERCISES: CPT

## 2018-02-24 PROCEDURE — 73560 X-RAY EXAM OF KNEE 1 OR 2: CPT

## 2018-02-24 PROCEDURE — 85025 COMPLETE CBC W/AUTO DIFF WBC: CPT

## 2018-02-24 PROCEDURE — G0378 HOSPITAL OBSERVATION PER HR: HCPCS

## 2018-02-24 PROCEDURE — 97166 OT EVAL MOD COMPLEX 45 MIN: CPT

## 2018-02-24 PROCEDURE — 97162 PT EVAL MOD COMPLEX 30 MIN: CPT

## 2018-02-24 PROCEDURE — 76882 US LMTD JT/FCL EVL NVASC XTR: CPT

## 2018-02-24 PROCEDURE — 96375 TX/PRO/DX INJ NEW DRUG ADDON: CPT

## 2018-02-24 PROCEDURE — 96376 TX/PRO/DX INJ SAME DRUG ADON: CPT

## 2018-02-24 PROCEDURE — 93971 EXTREMITY STUDY: CPT

## 2018-02-24 PROCEDURE — 80053 COMPREHEN METABOLIC PANEL: CPT

## 2018-02-24 PROCEDURE — 93005 ELECTROCARDIOGRAM TRACING: CPT

## 2018-02-24 RX ADMIN — LOSARTAN POTASSIUM SCH MG: 25 TABLET, FILM COATED ORAL at 20:24

## 2018-02-24 RX ADMIN — HYDROCODONE BITARTRATE AND ACETAMINOPHEN PRN TAB: 7.5; 325 TABLET ORAL at 21:55

## 2018-02-24 RX ADMIN — MORPHINE SULFATE PRN MG: 2 INJECTION, SOLUTION INTRAMUSCULAR; INTRAVENOUS at 21:33

## 2018-02-24 RX ADMIN — STANDARDIZED SENNA CONCENTRATE AND DOCUSATE SODIUM SCH TAB: 8.6; 5 TABLET, FILM COATED ORAL at 21:35

## 2018-02-24 RX ADMIN — HEPARIN SODIUM SCH UNITS: 10000 INJECTION, SOLUTION INTRAVENOUS; SUBCUTANEOUS at 21:35

## 2018-02-24 NOTE — PD
HPI


Chief Complaint:  Edema


Time Seen by Provider:  16:12


Travel History


International Travel<30 days:  No


Contact w/Intl Traveler<30days:  No


Traveled to known affect area:  No





History of Present Illness


HPI


Patient presents with complaints of left knee pain.  States she had a knee 

replacement in 2008, since then she has intermittent swelling and severe pain 

that is uncontrolled with her home pain medication.  She does live in assisted 

living.  States she is unable to ambulate.  Denies any fall or misstep.  

Reports evaluation by orthopedic 2 days ago with aspiration of effusion.





PFSH


Past Medical History


Hx Anticoagulant Therapy:  No


Arthritis:  Yes


Blood Disorders:  No


Heart Rhythm Problems:  No


Cancer:  No


Cardiovascular Problems:  Yes (HTN, CHOL)


High Cholesterol:  Yes


Chest Pain:  Yes


Congestive Heart Failure:  No


Diabetes:  No


Diminished Hearing:  Yes (STS SHE WEARS BILAT HEARING)


Endocrine:  No


Gastrointestinal Disorders:  Yes (GI BLEED)


Genitourinary:  No


Hypertension:  Yes


Immune Disorder:  No


Implanted Vascular Access Dvce:  Yes


Musculoskeletal:  Yes


Neurologic:  Yes


Psychiatric:  No


Reproductive:  No


Respiratory:  No


Tetanus Vaccination:  > 5 Years


Influenza Vaccination:  Yes


Pregnant?:  Not Pregnant


Menopausal:  Yes





Past Surgical History


Abdominal Surgery:  Yes (APPENDECTOMY (CHILDHOOD))


AICD:  No


Appendectomy:  Yes (childhood)


Arteriovenous Shunt:  No


Cardiac Surgery:  Yes


Ear Surgery:  No


Endocrine Surgery:  No


Eye Surgery:  No


Genitourinary Surgery:  No


Gynecologic Surgery:  No


Insulin Pump:  No


Joint Replacement:  Yes (bilateral knee)


Oral Surgery:  Yes (T&A (CHILDHOOD))


Pacemaker:  No


Thoracic Surgery:  No


Tonsillectomy:  Yes


Other Surgery:  Yes (BREAST BX X 2)





Social History


Alcohol Use:  Yes (Occ.)


Tobacco Use:  No


Substance Use:  No





Allergies-Medications


(Allergen,Severity, Reaction):  


Coded Allergies:  


     diclofenac (Unverified  Allergy, Severe, SEVERE VOMITING, 2/24/18)


     paroxetine (Unverified  Allergy, Unknown, 2/24/18)


     sertraline (Unverified  Allergy, Unknown, 2/24/18)


     sulfamethoxazole (Unverified  Allergy, Unknown, 2/24/18)


     trimethoprim (Unverified  Allergy, Unknown, 2/24/18)


Uncoded Allergies:  


     PAIN MEDICATIONS (Allergy, Unknown, 1/7/17)


Reported Meds & Prescriptions





Reported Meds & Active Scripts


Active


Wheelchair (Device) 1 Mis Mis Ea .XX AS DIRECTED


Reported


Cephalexin 500 Mg Cap 500 Mg PO Q6H


Losartan (Losartan Potassium) 25 Mg Tab 25 Mg PO DAILY


Citalopram (Citalopram Hydrobromide) 10 Mg Tab 10 Mg PO DAILY


Ocuvite (Multiple Vitamins W/ Minerals) 1 Tab 1 Tab PO DAILY


Calcium Magnesium Caplet (Calcium Carb,Gluc/Mag Ox,Gluc) 500 Mg Calcium-250 Mg 

Tablet 1 Tab PO DAILY


Omeprazole 20 Mg Tab 20 Mg PO DAILY


Lovastatin 40 Mg Tab 40 Mg PO DAILY


Potassium Chloride ER (Potassium Chloride) 10 Meq Cap 10 Meq PO EVERY OTHER DAY


Furosemide 20 Mg Tab 20 Mg PO EVERY OTHER DAY








Review of Systems


General / Constitutional:  No: Fever


Eyes:  No: Visual changes


HENT:  No: Headaches


Cardiovascular:  No: Chest Pain or Discomfort


Respiratory:  No: Shortness of Breath


Gastrointestinal:  No: Abdominal Pain


Genitourinary:  No: Dysuria


Musculoskeletal:  No: Pain


Skin:  No Rash


Neurologic:  No: Weakness


Psychiatric:  No: Depression


Endocrine:  No: Polydipsia


Hematologic/Lymphatic:  No: Easy Bruising





Physical Exam


Narrative


GENERAL: Well-nourished, well-developed patient.


SKIN: Focused skin assessment warm/dry.


HEAD: Normocephalic.


EYES: No scleral icterus. No injection or drainage. 


NECK: Supple, trachea midline. No JVD or lymphadenopathy.


CARDIOVASCULAR: Regular rate and rhythm without murmurs, gallops, or rubs. 


RESPIRATORY: Breath sounds equal bilaterally. No accessory muscle use.


GASTROINTESTINAL: Abdomen soft, non-tender, nondistended. 


MUSCULOSKELETAL: No cyanosis, or edema. 


BACK: Nontender without obvious deformity. No CVA tenderness. 


Examination the left knee is difficult.  Patient reports significant pain with 

any range of motion or palpation.  There is some ecchymosis on the distal 

aspect of the patella





Data


Data


Last Documented VS





Vital Signs








  Date Time  Temp Pulse Resp B/P (MAP) Pulse Ox O2 Delivery O2 Flow Rate FiO2


 


2/24/18 15:47 98.5 68 20 212/81 (124) 100   








Orders





 Orders


Ondansetron Inj (Zofran Inj) (2/24/18 16:15)


Knee, Ltd (1 Or 2vws) (2/24/18 )


Hydromorphone Pf Inj (Dilaudid Pf Inj) (2/24/18 16:45)








MDM


Medical Decision Making


Medical Screen Exam Complete:  Yes


Emergency Medical Condition:  Yes


Differential Diagnosis


Malingering, left knee contusion, left knee sprain, fibula fracture, patella 

fracture


Narrative Course


Assessment and plan discussed with patient at bedside.  Patient received 

Dilaudid with improvement of pain.  Patient is unwilling/unable to ambulate.


Last 72 hours Impressions








Knee X-Ray 2/24/18 0000 Signed





Impressions: 





 Service Date/Time:  Saturday, February 24, 2018 16:22 - CONCLUSION:  1. No 

acute 





 fracture or joint dislocation. 2. Joint effusion. This was recently aspirated.

   





   Vern Perdue MD 











Physician Communication


Physician Communication


Spoke with Dr Reyes who is in agreement will admit with case management consult 

for placement for skilled nursing facility





Diagnosis





 Primary Impression:  


 Intractable pain


 Additional Impression:  


 Left knee pain


 Qualified Codes:  M25.562 - Pain in left knee











Reyes Haynes MD Feb 24, 2018 16:23

## 2018-02-24 NOTE — RADRPT
EXAM DATE/TIME:  02/24/2018 16:22 

 

[facility] COMPARISON:     

ASPIRATION, KNEE, LEFT, February 03, 2018, 15:06.  KNEE LEFT LTD (1 OR 2VWS), May 03, 2010, 10:42.

 

                     

INDICATIONS :     

Left knee pain. No known injury.

                     

 

MEDICAL HISTORY :            

Arthritis. Hypercholesterolemia. Gastroesophageal reflux disease. Hypertension.   

 

SURGICAL HISTORY :        

Total knee replacement, left. Total knee replacement, right. Appendectomy.Tonsillectomy

 

ENCOUNTER:     

Initial                                        

 

ACUITY:     

1 day      

 

PAIN SCORE:     

10/10

 

LOCATION:     

Left  knee, entire

 

FINDINGS:     

Two view examination of the left knee demonstrates no evidence of fracture or dislocation. There is a
 knee prosthesis in place. There is good position and alignment of the knee prosthesis. There is evid
ence of a joint effusion. Patient recently had a left knee joint aspiration on 2/5/2018..

 

CONCLUSION:     

1. No acute fracture or joint dislocation.

2. Joint effusion. This was recently aspirated.

 

 

 

 Vern Perdue MD on February 24, 2018 at 16:40           

Board Certified Radiologist.

 This report was verified electronically.

## 2018-02-25 VITALS
SYSTOLIC BLOOD PRESSURE: 153 MMHG | DIASTOLIC BLOOD PRESSURE: 68 MMHG | OXYGEN SATURATION: 98 % | TEMPERATURE: 97.2 F | RESPIRATION RATE: 20 BRPM | HEART RATE: 72 BPM

## 2018-02-25 VITALS
DIASTOLIC BLOOD PRESSURE: 88 MMHG | TEMPERATURE: 97.6 F | HEART RATE: 87 BPM | SYSTOLIC BLOOD PRESSURE: 193 MMHG | OXYGEN SATURATION: 95 % | RESPIRATION RATE: 20 BRPM

## 2018-02-25 VITALS
OXYGEN SATURATION: 97 % | TEMPERATURE: 98.3 F | HEART RATE: 74 BPM | SYSTOLIC BLOOD PRESSURE: 160 MMHG | DIASTOLIC BLOOD PRESSURE: 70 MMHG | RESPIRATION RATE: 17 BRPM

## 2018-02-25 VITALS
DIASTOLIC BLOOD PRESSURE: 63 MMHG | OXYGEN SATURATION: 98 % | RESPIRATION RATE: 24 BRPM | TEMPERATURE: 96.7 F | HEART RATE: 66 BPM | SYSTOLIC BLOOD PRESSURE: 138 MMHG

## 2018-02-25 VITALS
OXYGEN SATURATION: 94 % | TEMPERATURE: 97.2 F | HEART RATE: 75 BPM | DIASTOLIC BLOOD PRESSURE: 63 MMHG | SYSTOLIC BLOOD PRESSURE: 131 MMHG | RESPIRATION RATE: 20 BRPM

## 2018-02-25 VITALS
SYSTOLIC BLOOD PRESSURE: 123 MMHG | TEMPERATURE: 96.2 F | DIASTOLIC BLOOD PRESSURE: 58 MMHG | RESPIRATION RATE: 20 BRPM | HEART RATE: 70 BPM | OXYGEN SATURATION: 94 %

## 2018-02-25 LAB
ALBUMIN SERPL-MCNC: 3 GM/DL (ref 3.4–5)
ALP SERPL-CCNC: 54 U/L (ref 45–117)
ALT SERPL-CCNC: 9 U/L (ref 10–53)
AST SERPL-CCNC: 8 U/L (ref 15–37)
BASOPHILS # BLD AUTO: 0 TH/MM3 (ref 0–0.2)
BASOPHILS NFR BLD: 0.3 % (ref 0–2)
BILIRUB SERPL-MCNC: 0.4 MG/DL (ref 0.2–1)
BUN SERPL-MCNC: 17 MG/DL (ref 7–18)
CALCIUM SERPL-MCNC: 8.5 MG/DL (ref 8.5–10.1)
CHLORIDE SERPL-SCNC: 98 MEQ/L (ref 98–107)
CREAT SERPL-MCNC: 0.88 MG/DL (ref 0.5–1)
EOSINOPHIL # BLD: 0.1 TH/MM3 (ref 0–0.4)
EOSINOPHIL NFR BLD: 0.6 % (ref 0–4)
ERYTHROCYTE [DISTWIDTH] IN BLOOD BY AUTOMATED COUNT: 15.1 % (ref 11.6–17.2)
GFR SERPLBLD BASED ON 1.73 SQ M-ARVRAT: 61 ML/MIN (ref 89–?)
GLUCOSE SERPL-MCNC: 93 MG/DL (ref 74–106)
HCO3 BLD-SCNC: 27.8 MEQ/L (ref 21–32)
HCT VFR BLD CALC: 31.1 % (ref 35–46)
HGB BLD-MCNC: 9.9 GM/DL (ref 11.6–15.3)
LYMPHOCYTES # BLD AUTO: 1.5 TH/MM3 (ref 1–4.8)
LYMPHOCYTES NFR BLD AUTO: 12.8 % (ref 9–44)
MCH RBC QN AUTO: 27.5 PG (ref 27–34)
MCHC RBC AUTO-ENTMCNC: 31.9 % (ref 32–36)
MCV RBC AUTO: 86.3 FL (ref 80–100)
MONOCYTE #: 0.6 TH/MM3 (ref 0–0.9)
MONOCYTES NFR BLD: 5 % (ref 0–8)
NEUTROPHILS # BLD AUTO: 9.2 TH/MM3 (ref 1.8–7.7)
NEUTROPHILS NFR BLD AUTO: 81.3 % (ref 16–70)
PLATELET # BLD: 451 TH/MM3 (ref 150–450)
PMV BLD AUTO: 6.9 FL (ref 7–11)
PROT SERPL-MCNC: 6.6 GM/DL (ref 6.4–8.2)
RBC # BLD AUTO: 3.61 MIL/MM3 (ref 4–5.3)
SODIUM SERPL-SCNC: 134 MEQ/L (ref 136–145)
WBC # BLD AUTO: 11.4 TH/MM3 (ref 4–11)

## 2018-02-25 RX ADMIN — STANDARDIZED SENNA CONCENTRATE AND DOCUSATE SODIUM SCH TAB: 8.6; 5 TABLET, FILM COATED ORAL at 08:15

## 2018-02-25 RX ADMIN — CITALOPRAM SCH MG: 20 TABLET, FILM COATED ORAL at 08:13

## 2018-02-25 RX ADMIN — HEPARIN SODIUM SCH UNITS: 10000 INJECTION, SOLUTION INTRAVENOUS; SUBCUTANEOUS at 21:24

## 2018-02-25 RX ADMIN — MORPHINE SULFATE PRN MG: 2 INJECTION, SOLUTION INTRAMUSCULAR; INTRAVENOUS at 18:39

## 2018-02-25 RX ADMIN — MORPHINE SULFATE PRN MG: 2 INJECTION, SOLUTION INTRAMUSCULAR; INTRAVENOUS at 06:13

## 2018-02-25 RX ADMIN — PRAVASTATIN SODIUM SCH MG: 40 TABLET ORAL at 08:15

## 2018-02-25 RX ADMIN — HEPARIN SODIUM SCH UNITS: 10000 INJECTION, SOLUTION INTRAVENOUS; SUBCUTANEOUS at 04:59

## 2018-02-25 RX ADMIN — STANDARDIZED SENNA CONCENTRATE AND DOCUSATE SODIUM SCH TAB: 8.6; 5 TABLET, FILM COATED ORAL at 21:23

## 2018-02-25 RX ADMIN — HYDROCODONE BITARTRATE AND ACETAMINOPHEN PRN TAB: 7.5; 325 TABLET ORAL at 04:58

## 2018-02-25 RX ADMIN — PANTOPRAZOLE SODIUM SCH MG: 20 TABLET, DELAYED RELEASE ORAL at 08:14

## 2018-02-25 RX ADMIN — HEPARIN SODIUM SCH UNITS: 10000 INJECTION, SOLUTION INTRAVENOUS; SUBCUTANEOUS at 14:28

## 2018-02-25 RX ADMIN — HYDROCODONE BITARTRATE AND ACETAMINOPHEN PRN TAB: 7.5; 325 TABLET ORAL at 21:23

## 2018-02-25 RX ADMIN — Medication SCH TAB: at 08:28

## 2018-02-25 RX ADMIN — LOSARTAN POTASSIUM SCH MG: 25 TABLET, FILM COATED ORAL at 08:13

## 2018-02-25 RX ADMIN — MORPHINE SULFATE PRN MG: 2 INJECTION, SOLUTION INTRAMUSCULAR; INTRAVENOUS at 14:23

## 2018-02-26 VITALS
RESPIRATION RATE: 20 BRPM | SYSTOLIC BLOOD PRESSURE: 124 MMHG | HEART RATE: 70 BPM | DIASTOLIC BLOOD PRESSURE: 59 MMHG | OXYGEN SATURATION: 95 % | TEMPERATURE: 97.7 F

## 2018-02-26 VITALS
RESPIRATION RATE: 20 BRPM | DIASTOLIC BLOOD PRESSURE: 61 MMHG | SYSTOLIC BLOOD PRESSURE: 136 MMHG | TEMPERATURE: 97.4 F | OXYGEN SATURATION: 95 % | HEART RATE: 73 BPM

## 2018-02-26 VITALS
DIASTOLIC BLOOD PRESSURE: 58 MMHG | RESPIRATION RATE: 20 BRPM | OXYGEN SATURATION: 94 % | TEMPERATURE: 96.3 F | SYSTOLIC BLOOD PRESSURE: 139 MMHG | HEART RATE: 76 BPM

## 2018-02-26 VITALS
SYSTOLIC BLOOD PRESSURE: 146 MMHG | HEART RATE: 78 BPM | RESPIRATION RATE: 20 BRPM | DIASTOLIC BLOOD PRESSURE: 65 MMHG | TEMPERATURE: 96.9 F | OXYGEN SATURATION: 97 %

## 2018-02-26 VITALS
SYSTOLIC BLOOD PRESSURE: 143 MMHG | DIASTOLIC BLOOD PRESSURE: 75 MMHG | RESPIRATION RATE: 16 BRPM | TEMPERATURE: 98 F | HEART RATE: 69 BPM | OXYGEN SATURATION: 97 %

## 2018-02-26 LAB
BASOPHILS # BLD AUTO: 0 TH/MM3 (ref 0–0.2)
BASOPHILS NFR BLD: 0.3 % (ref 0–2)
EOSINOPHIL # BLD: 0.2 TH/MM3 (ref 0–0.4)
EOSINOPHIL NFR BLD: 1.3 % (ref 0–4)
ERYTHROCYTE [DISTWIDTH] IN BLOOD BY AUTOMATED COUNT: 15.2 % (ref 11.6–17.2)
HCT VFR BLD CALC: 29.6 % (ref 35–46)
HGB BLD-MCNC: 9.7 GM/DL (ref 11.6–15.3)
LYMPHOCYTES # BLD AUTO: 2.2 TH/MM3 (ref 1–4.8)
LYMPHOCYTES NFR BLD AUTO: 18.2 % (ref 9–44)
MCH RBC QN AUTO: 28.4 PG (ref 27–34)
MCHC RBC AUTO-ENTMCNC: 32.7 % (ref 32–36)
MCV RBC AUTO: 87 FL (ref 80–100)
MONOCYTE #: 0.9 TH/MM3 (ref 0–0.9)
MONOCYTES NFR BLD: 7.1 % (ref 0–8)
NEUTROPHILS # BLD AUTO: 9 TH/MM3 (ref 1.8–7.7)
NEUTROPHILS NFR BLD AUTO: 73.1 % (ref 16–70)
PLATELET # BLD: 412 TH/MM3 (ref 150–450)
PMV BLD AUTO: 6.9 FL (ref 7–11)
RBC # BLD AUTO: 3.4 MIL/MM3 (ref 4–5.3)
WBC # BLD AUTO: 12.3 TH/MM3 (ref 4–11)

## 2018-02-26 RX ADMIN — PANTOPRAZOLE SODIUM SCH MG: 20 TABLET, DELAYED RELEASE ORAL at 10:39

## 2018-02-26 RX ADMIN — PRAVASTATIN SODIUM SCH MG: 40 TABLET ORAL at 10:38

## 2018-02-26 RX ADMIN — FUROSEMIDE SCH MG: 20 TABLET ORAL at 10:38

## 2018-02-26 RX ADMIN — MORPHINE SULFATE PRN MG: 2 INJECTION, SOLUTION INTRAMUSCULAR; INTRAVENOUS at 03:31

## 2018-02-26 RX ADMIN — Medication SCH TAB: at 10:38

## 2018-02-26 RX ADMIN — STANDARDIZED SENNA CONCENTRATE AND DOCUSATE SODIUM SCH TAB: 8.6; 5 TABLET, FILM COATED ORAL at 22:22

## 2018-02-26 RX ADMIN — LOSARTAN POTASSIUM SCH MG: 25 TABLET, FILM COATED ORAL at 10:38

## 2018-02-26 RX ADMIN — HYDROCODONE BITARTRATE AND ACETAMINOPHEN PRN TAB: 7.5; 325 TABLET ORAL at 05:50

## 2018-02-26 RX ADMIN — HYDROCODONE BITARTRATE AND ACETAMINOPHEN PRN TAB: 7.5; 325 TABLET ORAL at 15:36

## 2018-02-26 RX ADMIN — HEPARIN SODIUM SCH UNITS: 10000 INJECTION, SOLUTION INTRAVENOUS; SUBCUTANEOUS at 05:50

## 2018-02-26 RX ADMIN — CITALOPRAM SCH MG: 20 TABLET, FILM COATED ORAL at 10:38

## 2018-02-26 RX ADMIN — POTASSIUM CHLORIDE SCH MEQ: 750 TABLET, FILM COATED, EXTENDED RELEASE ORAL at 10:38

## 2018-02-26 RX ADMIN — HEPARIN SODIUM SCH UNITS: 10000 INJECTION, SOLUTION INTRAVENOUS; SUBCUTANEOUS at 15:36

## 2018-02-26 RX ADMIN — HEPARIN SODIUM SCH UNITS: 10000 INJECTION, SOLUTION INTRAVENOUS; SUBCUTANEOUS at 22:22

## 2018-02-26 RX ADMIN — HYDROCODONE BITARTRATE AND ACETAMINOPHEN PRN TAB: 7.5; 325 TABLET ORAL at 22:22

## 2018-02-26 RX ADMIN — STANDARDIZED SENNA CONCENTRATE AND DOCUSATE SODIUM SCH TAB: 8.6; 5 TABLET, FILM COATED ORAL at 10:39

## 2018-02-26 NOTE — PD.CONS
__________________________________________________





HPI


Service


Orthopedic Surgeons


Consult Requested By





Reason for Consult


Recurrent effusion, left knee.


Primary Care Physician


Non-Staff


Admission Diagnosis


Intractable pain/left knee pain/unable to ambulate


Diagnoses:  


(1) Effusion, left knee


(2) Hypertension


Chief Complaint:  


Left knee pain.  Recurrent effusion, left knee.


History of Present Illness


This 86-year-old woman is well known to me, having had a left total knee 

arthroplasty in 2008.  She has been doing well over the past several years.  

Recently, she had been hospitalized at [facility] for an 

effusion in the left knee.  She had an arthrocentesis under ultrasound with 

return of some bloody fluid.  Apparently laboratory studies were benign.  She 

was seen by the undersigned on 2/21/2018.  At that time, she was found to have 

a small area of ecchymosis on the anteromedial aspect of the left knee with a 

minimal effusion.  She had good motion and no significant tenderness.  

Subsequently, the patient has had spontaneous onset of swelling in her left 

knee.  She does not recall any specific injury.  She indicates that her 

daughter is coming down from [place] to move her back to [place] with her on 3

/2/2018.





Past Family Social History


Past Medical History


Hypertension


Hyperlipidemia


Bilateral hernia.


History of GI bleed


Past Surgical History


Bilateral knee replacement


Appendectomy


Tonsillectomy


Breast biopsy


Allergies:  


Coded Allergies:  


     diclofenac (Unverified  Allergy, Severe, SEVERE VOMITING, 2/24/18)


     paroxetine (Unverified  Allergy, Unknown, 2/24/18)


     sertraline (Unverified  Allergy, Unknown, 2/24/18)


     sulfamethoxazole (Unverified  Allergy, Unknown, 2/24/18)


     trimethoprim (Unverified  Allergy, Unknown, 2/24/18)


Uncoded Allergies:  


     PAIN MEDICATIONS (Allergy, Unknown, 1/7/17)


Active Ordered Medications





Current Medications








 Medications


  (Trade)  Dose


 Ordered  Sig/Jayne


 Route  Start Time


 Stop Time Status Last Admin


 


  (CeleXA)  10 mg  DAILY


 PO  2/25/18 09:00


    2/26/18 10:38


 


 


  (Lasix)  20 mg  EVERY OTHER  DAY


 PO  2/26/18 09:00


    2/26/18 10:38


 


 


  (Cozaar)  25 mg  DAILY


 PO  2/24/18 19:00


    2/26/18 10:38


 


 


  (Pravachol)  40 mg  DAILY


 PO  2/25/18 09:00


    2/26/18 10:38


 


 


  (Ocuvite)  1 tab  DAILY


 PO  2/25/18 09:00


    2/26/18 10:38


 


 


  (KCl)  10 meq  EVERY OTHER  DAY


 PO  2/26/18 09:00


    2/26/18 10:38


 


 


  (Protonix)  20 mg  DAILY


 PO  2/25/18 09:00


    2/26/18 10:39


 


 


  (Vasotec Inj)  1.25 mg  Q6H  PRN


 IV PUSH  2/24/18 19:00


    2/24/18 21:33


 


 


  (Norco  5-325 Mg)  1 tab  Q4H  PRN


 PO  2/24/18 19:00


     


 


 


  (Norco  7.5-325


 Mg)  1 tab  Q4H  PRN


 PO  2/24/18 19:00


    2/26/18 05:50


 


 


  (Morphine Inj)  1 mg  Q4H  PRN


 IV PUSH  2/24/18 19:30


     


 


 


  (Morphine Inj)  2 mg  Q4H  PRN


 IV PUSH  2/24/18 19:30


    2/26/18 03:31


 


 


  (Tylenol)  650 mg  Q4H  PRN


 PO  2/24/18 19:00


     


 


 


  (Zofran Inj)  4 mg  Q6H  PRN


 IVP  2/24/18 19:00


    2/26/18 11:55


 


 


  (Heparin Inj)  5,000 units  Q8HR


 SQ  2/24/18 22:00


    2/26/18 05:50


 


 


  (Narcan Inj)  0.4 mg  UNSCH  PRN


 IV PUSH  2/24/18 19:00


     


 


 


  (Giovanna-Colace)  1 tab  BID


 PO  2/24/18 21:00


    2/26/18 10:39


 


 


  (Milk Of


 Magnesia Liq)  30 ml  Q12H  PRN


 PO  2/24/18 19:00


     


 


 


  (Senokot)  17.2 mg  Q12H  PRN


 PO  2/24/18 19:00


     


 


 


  (Dulcolax Supp)  10 mg  DAILY  PRN


 RECTAL  2/24/18 19:00


     


 


 


  (Lactulose Liq)  30 ml  DAILY  PRN


 PO  2/24/18 19:00


     


 


 


  (Pill Splitter)  1 ea  UNSCH  PRN


 OTHER  2/24/18 19:30


     


 








Reported Meds & Active Scripts


Active


Wheelchair (Device) 1 Mis Mis Ea .XX AS DIRECTED


Reported


Cephalexin 500 Mg Cap 500 Mg PO Q6H


Losartan (Losartan Potassium) 25 Mg Tab 25 Mg PO DAILY


Citalopram (Citalopram Hydrobromide) 10 Mg Tab 10 Mg PO DAILY


Ocuvite (Multiple Vitamins W/ Minerals) 1 Tab 1 Tab PO DAILY


Calcium Magnesium Caplet (Calcium Carb,Gluc/Mag Ox,Gluc) 500 Mg Calcium-250 Mg 

Tablet 1 Tab PO DAILY


Omeprazole 20 Mg Tab 20 Mg PO DAILY


Lovastatin 40 Mg Tab 40 Mg PO DAILY


Potassium Chloride ER (Potassium Chloride) 10 Meq Cap 10 Meq PO EVERY OTHER DAY


Furosemide 20 Mg Tab 20 Mg PO EVERY OTHER DAY


Family History


Both maternal and paternal medical history significant for cardiovascular 

disease.


Social History


Denies any tobacco use, alcohol or illicit drug use.





Physical Exam


Vital Signs





Vital Signs








  Date Time  Temp Pulse Resp B/P (MAP) Pulse Ox O2 Delivery O2 Flow Rate FiO2


 


2/26/18 11:33 96.9 78 20 146/65 (92) 97   


 


2/26/18 07:50 97.4 73 20 136/61 (86) 95   


 


2/26/18 00:00 98.0 69 16 143/75 (97) 97   


 


2/25/18 20:00 98.3 74 17 160/70 (100) 97   


 


2/25/18 15:59 97.2 75 20 131/63 (85) 94   








Physical Exam


The left knee has intact skin however there is some ecchymosis on the anterior 

medial aspect of the knee and the peripatellar area.  This seems to be slightly 

greater in amount of ecchymosis than it was when I saw her 5 days ago.  Her 

neurovascular status is intact.  There is a large effusion.  There is some 

tenderness on attempted motion.  There is no instability.  Neurovascular status 

of the lower extremity is intact.


Laboratory





Laboratory Tests








Test


  2/25/18


13:19 2/26/18


05:40


 


White Blood Count 11.4  12.3 


 


Red Blood Count 3.61  3.40 


 


Hemoglobin 9.9  9.7 


 


Hematocrit 31.1  29.6 


 


Mean Corpuscular Volume 86.3  87.0 


 


Mean Corpuscular Hemoglobin 27.5  28.4 


 


Mean Corpuscular Hemoglobin


Concent 31.9 


  32.7 


 


 


Red Cell Distribution Width 15.1  15.2 


 


Platelet Count 451  412 


 


Mean Platelet Volume 6.9  6.9 


 


Neutrophils (%) (Auto) 81.3  73.1 


 


Lymphocytes (%) (Auto) 12.8  18.2 


 


Monocytes (%) (Auto) 5.0  7.1 


 


Eosinophils (%) (Auto) 0.6  1.3 


 


Basophils (%) (Auto) 0.3  0.3 


 


Neutrophils # (Auto) 9.2  9.0 


 


Lymphocytes # (Auto) 1.5  2.2 


 


Monocytes # (Auto) 0.6  0.9 


 


Eosinophils # (Auto) 0.1  0.2 


 


Basophils # (Auto) 0.0  0.0 


 


CBC Comment DIFF FINAL  DIFF FINAL 


 


Differential Comment    








Result Diagram:  


2/26/18 0540                                                                   

             2/25/18 0739





Imaging





Last 72 hours Impressions








Knee X-Ray 2/24/18 0000 Signed





Impressions: 





 Service Date/Time:  Saturday, February 24, 2018 16:22 - CONCLUSION:  1. No 

acute 





 fracture or joint dislocation. 2. Joint effusion. This was recently aspirated.

   





   Vern Perdue MD 











Assessment & Plan


Problem List:  


(1) Status post total left knee replacement


ICD Codes:  Z96.652 - Presence of left artificial knee joint


Status:  Chronic


(2) Effusion, left knee


ICD Codes:  M25.462 - Effusion, left knee


Status:  Acute


Assessment and Plan


Arthrocentesis of the left knee by the interventional radiologist using 

ultrasound to aspirate the knee as dry as possible would be appropriate.  If 

she then has a recurrence of her effusion, it might be appropriate for her to 

have an angiogram and possible embolization of any bleeder that might be 

causing a recurrent hemarthrosis.  At this time, I do not think that surgical 

intervention would be appropriate.  She may ambulate as tolerated at this time 

when her symptoms allow this.











ROBERTO Monroe MD (Charles) Feb 26, 2018 12:36

## 2018-02-26 NOTE — HHI.PR
Subjective


Remarks


Follow-up left knee effusion.  Patient seen and examined, still complains of 

left knee pain despite pain medications.  Swelling still present.  PT worked 

with patient today, limited range of motion both passive and active.  Spoke to 

Dr. Monroe's office, awaiting callback from orthopedics.  Vital signs are 

stable.  Patient tolerating p.o. intake, denies any abdominal pain, nausea or 

vomiting.





Objective


Vitals





Vital Signs








  Date Time  Temp Pulse Resp B/P (MAP) Pulse Ox O2 Delivery O2 Flow Rate FiO2


 


2/26/18 07:50 97.4 73 20 136/61 (86) 95   


 


2/26/18 00:00 98.0 69 16 143/75 (97) 97   


 


2/25/18 20:00 98.3 74 17 160/70 (100) 97   


 


2/25/18 15:59 97.2 75 20 131/63 (85) 94   


 


2/25/18 12:00 96.2 70 20 123/58 (79) 94   














I/O      


 


 2/25/18 2/25/18 2/25/18 2/26/18 2/26/18 2/26/18





 06:59 14:59 22:59 06:59 14:59 22:59


 


Intake Total 240 ml 720 ml  180 ml  


 


Balance 240 ml 720 ml  180 ml  


 


      


 


Intake Oral 240 ml 720 ml  180 ml  


 


# Voids 1 1    








Result Diagram:  


2/26/18 0540                                                                   

             2/25/18 0739





Imaging





Last Impressions








Knee X-Ray 2/24/18 0000 Signed





Impressions: 





 Service Date/Time:  Saturday, February 24, 2018 16:22 - CONCLUSION:  1. No 

acute 





 fracture or joint dislocation. 2. Joint effusion. This was recently aspirated.

   





   Vern Perdue MD 








Objective Remarks


GENERAL: Well-developed, well-nourished patient in [facility].


SKIN: Warm and dry. No rash.


HEAD:  Normocephalic. Atraumatic.


EYES: Pupils equal and round. No scleral icterus. No injection or drainage. 


ENT: No nasal bleeding or discharge.  Mucous membranes pink and moist.


NECK: Supple. Trachea midline.  


CARDIOVASCULAR: Regular rate and rhythm.  S1, S2 noted. No murmur appreciated. 


RESPIRATORY: No accessory muscle use. Clear to auscultation. Breath sounds 

equal bilaterally.  


GASTROINTESTINAL: Abdomen soft, non-tender, nondistended. Normoactive bowel 

sounds x4.


MUSCULOSKELETAL: No obvious deformities.  Left knee swelling no erythema, 

minimal range of motion both active and passive.


NEUROLOGICAL: Awake and alert. No obvious cranial nerve deficits.  Motor 

grossly within normal limits. 5/5 muscle strength in bilateral upper and lower 

extremities.  Normal speech.





A/P


Problem List:  


(1) Effusion, left knee


ICD Code:  M25.462 - Effusion, left knee


Status:  Acute


Plan:  


Patient with left knee intractable pain and inability to ambulate upon 

presentation to the ED.


Spoke to Dr. Monroe's office, records reviewed, there is no record of 

aspiration of left knee effusion.  Patient has been reporting recent aspiration 

of left knee joint effusion last week.  Patient still has complaints of pain.  

And limited range of motion both active and passive.  PT is following.  Will 

obtain left lower extremity ultrasound to rule out DVT.  Continue to follow.


Left knee x-ray reviewed showing joint effusion.  No acute fracture.  Knee is 

swollen and painful to range of motion.


Morphine IV available per pain scale.  As well as [place] p.o. available per pain 

scale.


Supportive care.


Spoke to Dr. Monroe at 1155 who was updated about patient's status.  

Patient reports aspiration from Dr. Monroe last week, although Dr. Monroe says he did not aspirate her left knee.  Reports from 2/2/18 patient 

was admitted to the hospital and underwent aspiration of left knee joint by 

radiology.  Fluid was sent and showing no growth or infection.  Awaiting Dr. Monroe to come see patient today.





(2) Hypertension


ICD Code:  I10 - Essential (primary) hypertension


Plan:  Blood pressure controlled.  Continue home medications.  Monitor BP 

trends.





DVT prophylaxis: SCDs.  Heparin.





Assessment and Plan


1500 Spoke to Dr. Chambers, radiologist, who plans to do arthrocentesis of the 

left knee tomorrow. Continue pain control and PT.











Vanessa Oscar Feb 26, 2018 08:44

## 2018-02-26 NOTE — RADRPT
EXAM DATE/TIME:  02/26/2018 13:17 

 

[facility] COMPARISON:     

US LEG LEFT VENOUS DOPPLER, February 02, 2018, 21:17.

        

 

 

INDICATIONS :                

Left leg pain. 

            

 

MEDICAL HISTORY :     

Hypercholesterolemia.  Hypertension.  Arthritis.  

 

SURGICAL HISTORY :     

Tonsillectomy. Appendectomy.   

 

ENCOUNTER:     

Subsequent

 

ACUITY:     

2 weeks

 

PAIN SCORE:      

3/10

 

LOCATION:      

Left  leg.

            

                      

 

TECHNIQUE:     

Venous ultrasound of the leg was performed from the inguinal ligament to the proximal calf.  Real-milena
e, color Doppler and spectral tracing, compression and augmentation techniques were used.  

 

FINDINGS:     

 

There is 17 cm x 2.5 cm popliteal fluid collection, probably ruptured in popliteal cyst.

There is no deep venous thrombosis.  There is normal compressibility of the deep venous system from t
he inguinal region to the proximal calf.  No echogenic clot is seen in the lumen of the common femora
l, femoral, popliteal, and posterior tibial veins.  There is a normal response of the venous system t
o proximal and distal augmentation and respiration.  

 

CONCLUSION:     

1. No deep venous thrombosis.

2. Probable ruptured in popliteal cyst.

 

 

 

 Jon Perez MD FACR on February 26, 2018 at 13:55           

Board Certified Radiologist.

 This report was verified electronically.

## 2018-02-27 VITALS
RESPIRATION RATE: 16 BRPM | HEART RATE: 74 BPM | SYSTOLIC BLOOD PRESSURE: 126 MMHG | DIASTOLIC BLOOD PRESSURE: 59 MMHG | TEMPERATURE: 97.4 F | OXYGEN SATURATION: 92 %

## 2018-02-27 VITALS
HEART RATE: 73 BPM | RESPIRATION RATE: 16 BRPM | TEMPERATURE: 98.2 F | DIASTOLIC BLOOD PRESSURE: 75 MMHG | OXYGEN SATURATION: 96 % | SYSTOLIC BLOOD PRESSURE: 162 MMHG

## 2018-02-27 VITALS
TEMPERATURE: 98.6 F | OXYGEN SATURATION: 93 % | DIASTOLIC BLOOD PRESSURE: 66 MMHG | SYSTOLIC BLOOD PRESSURE: 125 MMHG | HEART RATE: 78 BPM | RESPIRATION RATE: 16 BRPM

## 2018-02-27 VITALS
TEMPERATURE: 97.4 F | DIASTOLIC BLOOD PRESSURE: 60 MMHG | HEART RATE: 72 BPM | SYSTOLIC BLOOD PRESSURE: 126 MMHG | OXYGEN SATURATION: 96 % | RESPIRATION RATE: 20 BRPM

## 2018-02-27 VITALS
DIASTOLIC BLOOD PRESSURE: 69 MMHG | RESPIRATION RATE: 16 BRPM | HEART RATE: 74 BPM | OXYGEN SATURATION: 92 % | TEMPERATURE: 97.7 F | SYSTOLIC BLOOD PRESSURE: 149 MMHG

## 2018-02-27 RX ADMIN — HEPARIN SODIUM SCH UNITS: 10000 INJECTION, SOLUTION INTRAVENOUS; SUBCUTANEOUS at 21:10

## 2018-02-27 RX ADMIN — Medication SCH TAB: at 07:32

## 2018-02-27 RX ADMIN — STANDARDIZED SENNA CONCENTRATE AND DOCUSATE SODIUM SCH TAB: 8.6; 5 TABLET, FILM COATED ORAL at 21:10

## 2018-02-27 RX ADMIN — PANTOPRAZOLE SODIUM SCH MG: 20 TABLET, DELAYED RELEASE ORAL at 07:33

## 2018-02-27 RX ADMIN — LOSARTAN POTASSIUM SCH MG: 25 TABLET, FILM COATED ORAL at 07:32

## 2018-02-27 RX ADMIN — HEPARIN SODIUM SCH UNITS: 10000 INJECTION, SOLUTION INTRAVENOUS; SUBCUTANEOUS at 13:03

## 2018-02-27 RX ADMIN — MORPHINE SULFATE PRN MG: 2 INJECTION, SOLUTION INTRAMUSCULAR; INTRAVENOUS at 08:36

## 2018-02-27 RX ADMIN — HEPARIN SODIUM SCH UNITS: 10000 INJECTION, SOLUTION INTRAVENOUS; SUBCUTANEOUS at 05:59

## 2018-02-27 RX ADMIN — PRAVASTATIN SODIUM SCH MG: 40 TABLET ORAL at 09:00

## 2018-02-27 RX ADMIN — STANDARDIZED SENNA CONCENTRATE AND DOCUSATE SODIUM SCH TAB: 8.6; 5 TABLET, FILM COATED ORAL at 07:33

## 2018-02-27 RX ADMIN — CITALOPRAM SCH MG: 20 TABLET, FILM COATED ORAL at 07:33

## 2018-02-27 NOTE — EKG
Date Performed: 02/27/2018       Time Performed: 03:56:17

 

PTAGE:      86 years

 

EKG:      Sinus rhythm 

 

 MARKED LEFT AXIS DEVIATION ABNORMAL ECG

 

PREVIOUS TRACING       : 01/28/2017 08.38 Since the prior tracing, there has been no significant randolph


 

DOCTOR:   Ran Rivas  Interpretating Date/Time  02/27/2018 22:08:13

## 2018-02-27 NOTE — HHI.PR
Subjective


Remarks


Patient seen and examined today for follow-up on ambulate ambulate secondary to 

pain.  Patient was just admitted began this month for the same thing in found 

to have a hematoma and joint space.  Cultures were done which were negative for 

any infection.  However patient did undergo outpatient physical therapy with 

improvement, however physical therapy was discontinued and now she is having 

difficulty ambulating and pain.  Patient had further workup done in emergency 

department and again x-ray shows some joint effusion that was recently 

aspirated.  Patient was admitted with pain control and orthopedic recommended 

radiology aspiration of the effusion.  This was evaluated by Dr. Calzada who 

indicated that there is a complex hypoechoic joint fluid.  Fluid in the Baker 

cyst and likely extravasated fluid from the Baker cyst in the medial left calf.

  None of which are amenable to simple aspiration.





Objective


Vitals





Vital Signs








  Date Time  Temp Pulse Resp B/P (MAP) Pulse Ox O2 Delivery O2 Flow Rate FiO2


 


2/27/18 00:00 97.4 72 20 126/60 (82) 96   


 


2/26/18 20:00 97.7 70 20 124/59 (80) 95   


 


2/26/18 16:36   18     


 


2/26/18 15:12 96.3 76 20 139/58 (85) 94   


 


2/26/18 11:33 96.9 78 20 146/65 (92) 97   














I/O      


 


 2/26/18 2/26/18 2/26/18 2/27/18 2/27/18 2/27/18





 07:00 15:00 23:00 07:00 15:00 23:00


 


Intake Total 180 ml  540 ml   


 


Balance 180 ml  540 ml   


 


      


 


Intake Oral 180 ml  540 ml   


 


# Voids   1   


 


# Bowel Movements   0   








Result Diagram:  


2/26/18 0540                                                                   

             2/25/18 0739





Objective Remarks


GENERAL: Well-developed, well-nourished, in no acute distress. alert and 

orientated


HEENT: Head is normocephalic without any lesions or masses noted. Facial 

features are symmetric. Eyes: Extraocular muscles are intact. Conjunctivae were 

clear. 


NECK: Supple without any masses. Trachea midline no deviation. No JVD, 


CARDIAC: Regular rhythm, regular rate. S1/S2 are heard.  2/6 holosystolic murmur

, no gallops or rubs.


LUNGS: Clear to auscultation bilaterally. No wheeze, rhonchi or rales. No use 

of accessory muscles on inspiration or expiration.


ABDOMEN: Soft, nontender. Nondistended. Bowel sounds heard in all 4 quadrants. 

No organomegaly or masses. Negative rebound, negative guarding


EXTREMITIES: No edema, pulses are equal bilaterally. No cyanosis or clubbing.  

Left knee with palpable tenderness noted over the medial inferior patellar 

region


NEUROLOGY: Mood and affect appear appropriate. Cranial nerves II through XII 

grossly intact. Moving all extremities, speech is clear


Urinary Catheter:  No


Vascular Central Line Catheter:  No





A/P


Assessment and Plan


Left knee pain with difficulty ambulating


   X-ray indicates  joint effusion, ultrasound indicates probable ruptured 

popliteal cyst


   Orthopedist was consulted for recommendations.  Indicated no surgical 

intervention.  Possible interventional radiologist to aspirate the knee under 

ultrasound.  Appropriate may need angiogram and possible embolization.


   We'll await further recommendations from orthopedist.  Radiologist indicated 

possible drain insertion with TPA


   Physical therapy evaluation to evaluate patient recommending rehabilitation





Hypertension, hyperlipidemia


   Home medications have been continued


   Vasotec, clonidine as needed





DVT prevention


   Subcutaneous heparin


Discharge Planning


Case management consulted for discharge planning











Jason Zapata Feb 27, 2018 11:18

## 2018-02-27 NOTE — RADRPT
EXAM DATE/TIME:  02/27/2018 12:36 

 

[facility] COMPARISON:     

No previous studies available for comparison.

        

 

 

INDICATIONS :     

Left leg baker cyst.

                     

 

MEDICAL HISTORY :           

Hypercholesterolemia. Hypertension. Arthritis.

 

SURGICAL HISTORY :     

Tonsillectomy. Appendectomy.   

 

ENCOUNTER:     

Initial

 

ACUITY:     

1 day

 

PAIN SCORE:     

3/10

 

LOCATION:     

Left   knee.

                     

AREA EVALUATED:       

Left knee and calf. 

 

FINDINGS:     

The patient's left knee was evaluated sonographically. There is complex fluid, presumed clotted blood
, in the suprapatellar bursa, an elongated slightly greater than 7 cm complex collection in the media
l left popliteal space consistent with Baker's cyst, also consistency suggestive of clotted blood. A 
separate fairly homogeneously hypoechoic collection extends down the medial left calf which is likely
 extravasated hemorrhagic fluid from Baker's cyst rupture. None of the fluid visualized is likely to 
be evacuated with any success utilizing an aspiration needle, rather drain placement potentially with
 irrigation and/or TPA instillation would likely be required. Aspiration was attempted earlier this m
onth and only a small volume of bloody fluid could be aspirated.

 

CONCLUSION:     Complex hypoechoic joint fluid, fluid in Baker's cyst and likely extravasated fluid f
rom Baker's cyst in the medial left calf. None of this appears amenable to simple aspiration. 

 

 

 Tai Calzada MD on February 27, 2018 at 11:17           

Board Certified Radiologist.

 This report was verified electronically.

## 2018-02-28 VITALS
HEART RATE: 76 BPM | SYSTOLIC BLOOD PRESSURE: 126 MMHG | OXYGEN SATURATION: 91 % | DIASTOLIC BLOOD PRESSURE: 65 MMHG | RESPIRATION RATE: 18 BRPM | TEMPERATURE: 99 F

## 2018-02-28 VITALS
TEMPERATURE: 97.8 F | DIASTOLIC BLOOD PRESSURE: 68 MMHG | SYSTOLIC BLOOD PRESSURE: 148 MMHG | OXYGEN SATURATION: 93 % | HEART RATE: 77 BPM | RESPIRATION RATE: 18 BRPM

## 2018-02-28 VITALS
OXYGEN SATURATION: 95 % | DIASTOLIC BLOOD PRESSURE: 75 MMHG | HEART RATE: 74 BPM | SYSTOLIC BLOOD PRESSURE: 175 MMHG | RESPIRATION RATE: 18 BRPM | TEMPERATURE: 97 F

## 2018-02-28 RX ADMIN — FUROSEMIDE SCH MG: 20 TABLET ORAL at 08:17

## 2018-02-28 RX ADMIN — CITALOPRAM SCH MG: 20 TABLET, FILM COATED ORAL at 08:17

## 2018-02-28 RX ADMIN — STANDARDIZED SENNA CONCENTRATE AND DOCUSATE SODIUM SCH TAB: 8.6; 5 TABLET, FILM COATED ORAL at 08:16

## 2018-02-28 RX ADMIN — PANTOPRAZOLE SODIUM SCH MG: 20 TABLET, DELAYED RELEASE ORAL at 08:16

## 2018-02-28 RX ADMIN — Medication SCH TAB: at 08:16

## 2018-02-28 RX ADMIN — LOSARTAN POTASSIUM SCH MG: 25 TABLET, FILM COATED ORAL at 08:16

## 2018-02-28 RX ADMIN — POTASSIUM CHLORIDE SCH MEQ: 750 TABLET, FILM COATED, EXTENDED RELEASE ORAL at 08:16

## 2018-02-28 RX ADMIN — HEPARIN SODIUM SCH UNITS: 10000 INJECTION, SOLUTION INTRAVENOUS; SUBCUTANEOUS at 14:32

## 2018-02-28 RX ADMIN — HEPARIN SODIUM SCH UNITS: 10000 INJECTION, SOLUTION INTRAVENOUS; SUBCUTANEOUS at 06:10

## 2018-02-28 NOTE — HHI.PR
Subjective


Remarks


Patient seen and examined today for follow-up on knee pain.  Patient states 

that pain has not significantly improved.  But is controlled with by mouth pain 

medication.  Daughter indicates that she spoke with orthopedist who plans to 

come in today to evaluate the patient and make recommendations.  Discussed with 

family and patient about discharge planning.  Patient would likely benefit from 

rehabilitation, however insurance will likely not cover.





Objective


Vitals





Vital Signs








  Date Time  Temp Pulse Resp B/P (MAP) Pulse Ox O2 Delivery O2 Flow Rate FiO2


 


2/28/18 08:00 97.0 74 18 175/75 (108) 95   


 


2/28/18 00:00 99.0 76 18 126/65 (85) 91   


 


2/27/18 20:00 98.6 78 16 125/66 (85) 93   


 


2/27/18 16:00 98.2 73 16 162/75 (104) 96   


 


2/27/18 12:00 97.4 74 16 126/59 (81) 92   














I/O      


 


 2/27/18 2/27/18 2/27/18 2/28/18 2/28/18 2/28/18





 06:59 14:59 22:59 06:59 14:59 22:59


 


      


 


# Voids   4 1  








Result Diagram:  


2/26/18 0540                                                                   

             2/25/18 0739





Objective Remarks


GENERAL: Well-developed, well-nourished, in no acute distress. alert and 

orientated


HEENT: Head is normocephalic without any lesions or masses noted. Facial 

features are symmetric. Eyes: Extraocular muscles are intact. Conjunctivae were 

clear. 


NECK: Supple without any masses. Trachea midline no deviation. No JVD, 


CARDIAC: Regular rhythm, regular rate. S1/S2 are heard.  2/6 holosystolic murmur

, no gallops or rubs.


LUNGS: Clear to auscultation bilaterally. No wheeze, rhonchi or rales. No use 

of accessory muscles on inspiration or expiration.


ABDOMEN: Soft, nontender. Nondistended. Bowel sounds heard in all 4 quadrants. 

No organomegaly or masses. Negative rebound, negative guarding


EXTREMITIES: No edema, pulses are equal bilaterally. No cyanosis or clubbing.  

Left knee with palpable tenderness noted over the medial inferior patellar 

region


NEUROLOGY: Mood and affect appear appropriate. Cranial nerves II through XII 

grossly intact. Moving all extremities, speech is clear


Urinary Catheter:  No


Vascular Central Line Catheter:  No





A/P


Assessment and Plan


Left knee pain with difficulty ambulating


   X-ray indicates  joint effusion, ultrasound indicates probable ruptured 

popliteal cyst


   Orthopedist was consulted for recommendations.  Indicated no surgical 

intervention.  Possible interventional radiologist to aspirate the knee under 

ultrasound.  Appropriate may need angiogram and possible embolization.


   Awaiting further recommendations from orthopedist.  Radiologist indicated 

possible drain insertion with TPA


   Physical therapy evaluation to evaluate patient recommending rehabilitation





Hypertension, hyperlipidemia


   Home medications have been continued


   Clonidine as needed





DVT prevention


   Subcutaneous heparin


Discharge Planning


Case management consulted for discharge planning











Jason Zapata Feb 28, 2018 11:45

## 2018-02-28 NOTE — HHI.FF
Face to Face Verification


Diagnosis:  


(1) Effusion, left knee


(2) Status post total left knee replacement


(3) Left knee pain


Physical Therapy


Gait training


Knee:  Total knee, Protocol: Left, Gait training, Full weight bearing


Left LE Weight Bearing:  WB as tolerated


Left LE Range of Motion:  Active ROM











I have seen patient Marlene Elizabeth on 2/28/18. My clinical findings 

support the need for the requested home health care services because:








 Ltd mobility - disease progression





 Limited ability to care for self





 High risk of falls














I certify that my clinical findings support that this patient is homebound 

because:








 Unsteady gait/balance

















ROBERTO Monroe MD (Charles) Feb 28, 2018 12:56

## 2018-02-28 NOTE — HHI.DS
__________________________________________________





Discharge Summary


Admission Date


Feb 24, 2018 at 18:03


Discharge Date:  Feb 28, 2018


Admitting Diagnosis


Intractable pain/left knee pain/unable to ambulate





(1) Effusion, left knee


ICD Code:  M25.462 - Effusion, left knee


Status:  Acute


(2) Hypertension


ICD Code:  I10 - Essential (primary) hypertension


Procedures


None


Brief History - From Admission


This is a pleasant 86-year-old female patient with a known medical history of 

hypertension, hyperlipidemia who presented to the ED with complaints of left 

knee pain.  Patient states that she had a left knee replacement in 2008.  Since 

that time patient has intermittently complained of pain and swelling requiring 

home pain medications.  Patient does live at assisted living, at baseline she 

uses a walker for ambulation.  It was reported yesterday morning patient felt 

increasing pain and inability to ambulate after receiving a left knee 

aspiration for joint effusion by Dr. Monroe on Thursday.  Patient reports 

that Dr. Monroe aspirated the effusion and the drainage was analyzed which 

was reportedly negative.  Patient denies any recent illness including fever, 

chills, cough, shortness of breath, abdominal pain, nausea, vomiting, diarrhea 

or dysuria.


CBC/BMP:  


2/26/18 0540                                                                   

             2/25/18 0739





Significant Findings





Laboratory Tests








Test


  2/26/18


05:40


 


White Blood Count


  12.3 TH/MM3


(4.0-11.0)


 


Red Blood Count


  3.40 MIL/MM3


(4.00-5.30)


 


Hemoglobin


  9.7 GM/DL


(11.6-15.3)


 


Hematocrit


  29.6 %


(35.0-46.0)


 


Mean Platelet Volume


  6.9 FL


(7.0-11.0)


 


Neutrophils (%) (Auto)


  73.1 %


(16.0-70.0)


 


Neutrophils # (Auto)


  9.0 TH/MM3


(1.8-7.7)








Imaging





Last Impressions








Lower Extremity Ultrasound 2/27/18 0000 Signed





Impressions: 





 Service Date/Time:  Tuesday, February 27, 2018 12:36 - CONCLUSION: Complex 





 hypoechoic joint fluid, fluid in Baker's cyst and likely extravasated fluid 

from 





 Baker's cyst in the medial left calf. None of this appears amenable to simple 





 aspiration.     Tai Calzada MD 


 


Knee X-Ray 2/24/18 0000 Signed





Impressions: 





 Service Date/Time:  Saturday, February 24, 2018 16:22 - CONCLUSION:  1. No 

acute 





 fracture or joint dislocation. 2. Joint effusion. This was recently aspirated.

   





   Vern Perdue MD 








PE at Discharge


GENERAL: Well-developed, well-nourished, in no acute distress. alert and 

orientated


HEENT: Head is normocephalic without any lesions or masses noted. Facial 

features are symmetric. Eyes: Extraocular muscles are intact. Conjunctivae were 

clear. 


NECK: Supple without any masses. Trachea midline no deviation. No JVD, 


CARDIAC: Regular rhythm, regular rate. S1/S2 are heard.  2/6 holosystolic murmur

, no gallops or rubs.


LUNGS: Clear to auscultation bilaterally. No wheeze, rhonchi or rales. No use 

of accessory muscles on inspiration or expiration.


ABDOMEN: Soft, nontender. Nondistended. Bowel sounds heard in all 4 quadrants. 

No organomegaly or masses. Negative rebound, negative guarding


EXTREMITIES: No edema, pulses are equal bilaterally. No cyanosis or clubbing.  

Left knee with palpable tenderness noted over the medial inferior patellar 

region


NEUROLOGY: Mood and affect appear appropriate. Cranial nerves II through XII 

grossly intact. Moving all extremities, speech is clear


Hospital Course


Left knee pain with difficulty ambulating


   X-ray indicates  joint effusion, ultrasound indicates probable ruptured 

popliteal cyst


   Radiologist consulted for aspiration, unable to aspirate due to inadequate 

fluid.


   Orthopedist was consulted for recommendations.  Indicated no surgical 

intervention.  Recommended outpatient physical therapy with outpatient follow-up


   Physical therapy evaluation to evaluate patient recommending rehabilitation





Hypertension, hyperlipidemia


   Home medications have been continued


   Clonidine as needed


Pt Condition on Discharge:  Stable


Discharge Disposition:  Disch w/ Home Health Serv


Discharge Time:  > 30 minutes


Discharge Instructions


DIET: Follow Instructions for:  Heart Healthy Diet


Activities you can perform:  Non Weight Bearing


Follow up Referrals:  


Orthopedics - 2 Weeks


PCP Follow-up - 1 Week





New Medications:  


Bedside Commode (Bedside Commode) 1 Mis Mis


EA .XX AS DIRECTED, #1





Tramadol (Tramadol) 50 Mg Tab


50 MG PO Q8H PRN for PAIN for 10 Days, #30 TAB 0 Refills





 


Continued Medications:  


Calcium Carb,Gluc/Mag Ox,Gluc (Calcium Magnesium Caplet) 500 Mg Calcium-250 Mg 

Tablet


1 TAB PO DAILY





Citalopram (Citalopram) 10 Mg Tab


10 MG PO DAILY for Control Depression, #30 TAB 0 Refills





Furosemide (Furosemide) 20 Mg Tab


20 MG PO EVERY OTHER DAY, #30 TAB 0 Refills





Losartan (Losartan) 25 Mg Tab


25 MG PO DAILY for Blood Pressure Management, #30 TAB 0 Refills





Lovastatin (Lovastatin) 40 Mg Tab


40 MG PO DAILY for Cholesterol Management, #30 TAB 0 Refills





Multiple Vitamins W/ Minerals (Ocuvite) 1 Tab


1 TAB PO DAILY for Nutritional Supplement, TAB 0 Refills





Omeprazole (Omeprazole) 20 Mg Tab


20 MG PO DAILY, #30 TAB 0 Refills





Potassium Chloride ER (Potassium Chloride ER) 10 Meq Cap


10 MEQ PO EVERY OTHER DAY for Electrolyte Replacement, #30 CAP 0 Refills





Wheelchair (Wheelchair) 1 Mis Mis


EA .XX AS DIRECTED, #1 0 Refills (This prescription has been renewed)





 


Discontinued Medications:  


Cephalexin (Cephalexin) 500 Mg Cap


500 MG PO Q6H for Infection, CAP 0 Refills

















Jason Zapata Feb 28, 2018 15:18

## 2018-02-28 NOTE — PD.ORT.PN
Subjective


Subjective Remarks


She is doing better today than she was when seen 2 days ago.  She has less pain 

in the knee.  She has been able to walk better.  The physical therapist reports 

that she has better motion with less pain on motion.





She does not recall any specific injury to cause her current pain.  Her family 

is at bedside.  They indicate that they are planning on taking her by an air to 

[place] and subsequently a 2 hour drive to their home.  They do have 

an orthopedic surgeon who takes care of their family.


Range of Motion


0 extension to 65-70 of flexion.


Distance Walked


30 feet with physical therapy today, which is a steady improvement.





Objective


Vitals





Vital Signs








  Date Time  Temp Pulse Resp B/P (MAP) Pulse Ox O2 Delivery O2 Flow Rate FiO2


 


2/28/18 08:00 97.0 74 18 175/75 (108) 95   


 


2/28/18 00:00 99.0 76 18 126/65 (85) 91   


 


2/27/18 20:00 98.6 78 16 125/66 (85) 93   


 


2/27/18 16:00 98.2 73 16 162/75 (104) 96   














I/O      


 


 2/27/18 2/27/18 2/27/18 2/28/18 2/28/18 2/28/18





 06:59 14:59 22:59 06:59 14:59 22:59


 


      


 


# Voids   4 1  








Result Diagram:  


2/26/18 0540                                                                   

             2/25/18 0739





Imaging





Last 72 hours Impressions








Lower Extremity Ultrasound 2/27/18 0000 Signed





Impressions: 





 Service Date/Time:  Tuesday, February 27, 2018 12:36 - CONCLUSION: Complex 





 hypoechoic joint fluid, fluid in Baker's cyst and likely extravasated fluid 

from 





 Baker's cyst in the medial left calf. None of this appears amenable to simple 





 aspiration.     Tai Calzada MD 


 


Lower Extremity Ultrasound 2/26/18 0000 Signed





Impressions: 





 Service Date/Time:  Monday, February 26, 2018 13:17 - CONCLUSION:  1. No deep 





 venous thrombosis. 2. Probable ruptured in popliteal cyst.     Jon Perez MD  FA[facility]








Objective Remarks


She is resting comfortably, supine in bed with the left knee in extension.


There is no major tenderness in the knee at this time.





She is currently eating her lunch.  Her family is at bedside.





Assessment & Plan


Problem List:  


(1) Status post total left knee replacement


ICD Codes:  Z96.652 - Presence of left artificial knee joint


Status:  Chronic


(2) Effusion, left knee


ICD Codes:  M25.462 - Effusion, left knee


Status:  Acute


Plan:  She was seen by the interventional radiologist, Tai Calzada M.D.  After 

doing the ultrasound, he indicated that it was not likely to be beneficial to 

attempt an arthrocentesis because the contents of the knee are probably clotted 

blood which will not aspirate.





I would recommend that she continue with physical therapy for range of motion 

and ambulation as tolerated.  The clot will probably liquefy and ultimately can 

either be aspirated or left to absorb.  I would suggest the latter as it would 

be less invasive and less likely cause a potential infection.  The radiographic 

appearance of the prosthesis is satisfactory with no evidence of loosening or 

wear.  I would not expect any of this at this stage.





I have discussed her findings with her and her family (daughter and son-in-law) 

along with the recommendations.  She should be able to return to her [facility] (

[facility]), where she should be able to get physical therapy either at 

the facility or through a home health agency, [facility].  I would 

recommend daily therapy for the first week and then alternating days if she 

stays any longer.





I would not have any problems with her flying when she feels comfortable doing 

so.  A flight from [place] will probably only be about 2 hours.  The drive from 

the airport in [place] will probably be about 2 hours according to the family.

  The mobility between the drive to [place] and her flight as well as the 

arrival in [place] and the drive should be adequate for mobilization.





If she continues to have multiple recurrences of hemarthrosis, it might be 

useful to have an interventional radiologist do an angiogram and possibly 

embolize any bleeding vessels with Gelfoam while she is actively bleeding.  

This is supported in the literature.  I have seen this be effective.





I have no problem with her being discharged at any time.





Assessment and Plan


Condition: Good.


Orthopedically stable.


Discharge plans: Home ([facility]) with home health care.


An appointment was scheduled through the office previously, however she plans 

on leaving the area.   


Prescriptions: Tramadol 50 mg


She may be discharged at any time from an orthopedic standpoint.











ROBERTO Monroe MD (Charles) Feb 28, 2018 12:54

## 2025-03-03 NOTE — PD
Appt set for 4/3    Physical Exam


Narrative


I, Dr. Diaz, have reviewed the advance practice practitioner's documentation 

and am in agreement, met with the patient face to face, made the diagnosis, and 

the medical decision making was done by me.  





*My assessment and Findings: 


Patient is an 86-year-old female who comes in complaining of left knee pain.  

Exam shows a joint effusion, no erythema or warmth.  She says that she has been 

having this pain on and off for the past several weeks.  She says that she took 

a course of steroids once that helped with the pain.  She has not had any 

injury.  She has not had fever or chills.





Data


Data


Last Documented VS





Vital Signs








  Date Time  Temp Pulse Resp B/P (MAP) Pulse Ox O2 Delivery O2 Flow Rate FiO2


 


2/3/18 04:30   18     


 


2/3/18 03:54  74  151/71 (97) 95 Nasal Cannula 2.00 


 


2/2/18 18:38 98.3       








Orders





 Orders


Knee, Complete (4vws) (2/2/18 20:04)


Ice/Cold Pack (2/2/18 20:04)


Us Leg Venous Doppler (2/2/18 20:04)


Acetaminophen (Tylenol) (2/2/18 20:15)


Ondansetron  Odt (Zofran  Odt) (2/2/18 22:15)


Acetamin-Hydrocod 325-5 Mg (Norco  5-325 (2/2/18 22:15)


Iv Access Insert/Monitor (2/2/18 22:48)


Complete Blood Count With Diff (2/2/18 22:48)


Comprehensive Metabolic Panel (2/2/18 22:48)


Westergren Sedimentation Rate (2/2/18 22:48)


Morphine Inj (Morphine Inj) (2/2/18 23:45)


Morphine Inj (Morphine Inj) (2/2/18 23:45)


Prednisone (Deltasone) (2/2/18 23:45)


Morphine Inj (Morphine Inj) (2/3/18 04:45)


Morphine Inj (Morphine Inj) (2/3/18 04:45)


Admit Order (Ed Use Only) (2/3/18 )


Place In Observation (2/3/18 )


Vital Signs (Adult) Q4H (2/3/18 04:40)


Diet Heart Healthy (2/3/18 Breakfast)


Sodium Chloride 0.9% Flush (Ns Flush) (2/3/18 04:45)


Sodium Chloride 0.9% Flush (Ns Flush) (2/3/18 09:00)


Acetaminophen (Tylenol) (2/3/18 04:45)


Ondansetron Inj (Zofran Inj) (2/3/18 04:45)


Basic Metabolic Panel (Bmp) (2/4/18 06:00)


Complete Blood Count With Diff (2/4/18 06:00)


Pt Request For Service (2/3/18 04:40)


Heparin Inj (Heparin Inj) (2/3/18 05:00)


Naloxone Inj (Narcan Inj) (2/3/18 04:45)


Docusate Sodium-Senna (Giovanna-Colace) (2/3/18 09:00)


Magnesium Hydroxide Liq (Milk Of Magnesi (2/3/18 04:45)


Sennosides (Senokot) (2/3/18 04:45)


Bisacodyl Supp (Dulcolax Supp) (2/3/18 04:45)


Lactulose Liq (Lactulose Liq) (2/3/18 04:45)


Morphine Inj (Morphine Inj) (2/3/18 04:45)





Labs





Laboratory Tests








Test


  2/2/18


22:55


 


White Blood Count 16.0 TH/MM3 


 


Red Blood Count 4.73 MIL/MM3 


 


Hemoglobin 13.4 GM/DL 


 


Hematocrit 40.3 % 


 


Mean Corpuscular Volume 85.4 FL 


 


Mean Corpuscular Hemoglobin 28.4 PG 


 


Mean Corpuscular Hemoglobin


Concent 33.3 % 


 


 


Red Cell Distribution Width 14.2 % 


 


Platelet Count 319 TH/MM3 


 


Mean Platelet Volume 7.0 FL 


 


Neutrophils (%) (Auto) 72.3 % 


 


Lymphocytes (%) (Auto) 19.8 % 


 


Monocytes (%) (Auto) 6.2 % 


 


Eosinophils (%) (Auto) 0.3 % 


 


Basophils (%) (Auto) 1.4 % 


 


Neutrophils # (Auto) 11.6 TH/MM3 


 


Lymphocytes # (Auto) 3.2 TH/MM3 


 


Monocytes # (Auto) 1.0 TH/MM3 


 


Eosinophils # (Auto) 0.0 TH/MM3 


 


Basophils # (Auto) 0.2 TH/MM3 


 


CBC Comment DIFF FINAL 


 


Differential Comment  


 


Erythrocyte Sedimentation Rate 11 mm/hr 


 


Blood Urea Nitrogen 16 MG/DL 


 


Creatinine 0.85 MG/DL 


 


Random Glucose 128 MG/DL 


 


Total Protein 7.6 GM/DL 


 


Albumin 3.6 GM/DL 


 


Calcium Level 9.4 MG/DL 


 


Alkaline Phosphatase 73 U/L 


 


Aspartate Amino Transf


(AST/SGOT) 15 U/L 


 


 


Alanine Aminotransferase


(ALT/SGPT) 12 U/L 


 


 


Total Bilirubin 0.6 MG/DL 


 


Sodium Level 132 MEQ/L 


 


Potassium Level 4.2 MEQ/L 


 


Chloride Level 98 MEQ/L 


 


Carbon Dioxide Level 24.3 MEQ/L 


 


Anion Gap 10 MEQ/L 


 


Estimat Glomerular Filtration


Rate 63 ML/MIN 


 











[facility]


Supervised Visit with [facility]:  Yes


Narrative Course


X-ray shows a joint effusion, no other acute abnormalities.  Ultrasound is 

negative for DVT.  Patient was given Tylenol with no relief of her symptoms.  

Given a Norco with no relief of her symptoms.  Finally given morphine with 

relief of her symptoms.





After several hours, the morphine wore off and she had pain again.  She says 

she is unable to walk due to the pain.  Labs sent show an elevated white blood 

cell count, however ESR is negative.  Patient will be admitted for management 

of her intractable pain and inability to walk.


Diagnosis





 Primary Impression:  


 Knee pain, left


 Qualified Codes:  M25.562 - Pain in left knee


 Additional Impression:  


 Inability to walk





Admitting Information


Admitting Physician Requests:  Vanessa Cortes MD Feb 3, 2018 04:45